# Patient Record
Sex: MALE | Race: WHITE | Employment: STUDENT | URBAN - METROPOLITAN AREA
[De-identification: names, ages, dates, MRNs, and addresses within clinical notes are randomized per-mention and may not be internally consistent; named-entity substitution may affect disease eponyms.]

---

## 2024-01-09 DIAGNOSIS — D57.1 SICKLE CELL DISEASE WITHOUT CRISIS (H): ICD-10-CM

## 2024-01-09 DIAGNOSIS — Z13.6 SCREENING FOR HEART DISEASE: Primary | ICD-10-CM

## 2024-01-11 ENCOUNTER — HOSPITAL ENCOUNTER (OUTPATIENT)
Dept: CARDIOLOGY | Facility: CLINIC | Age: 24
Discharge: HOME | End: 2024-01-11
Attending: PREVENTIVE MEDICINE | Admitting: PREVENTIVE MEDICINE
Payer: COMMERCIAL

## 2024-01-11 PROCEDURE — 93005 ELECTROCARDIOGRAM TRACING: CPT

## 2024-01-11 PROCEDURE — 93010 ELECTROCARDIOGRAM REPORT: CPT | Performed by: INTERNAL MEDICINE

## 2024-01-12 ENCOUNTER — OFFICE VISIT (OUTPATIENT)
Dept: ORTHOPEDICS | Facility: CLINIC | Age: 24
End: 2024-01-12
Payer: COMMERCIAL

## 2024-01-12 ENCOUNTER — LAB (OUTPATIENT)
Dept: LAB | Facility: CLINIC | Age: 24
End: 2024-01-12
Payer: COMMERCIAL

## 2024-01-12 VITALS
BODY MASS INDEX: 24.75 KG/M2 | DIASTOLIC BLOOD PRESSURE: 67 MMHG | SYSTOLIC BLOOD PRESSURE: 125 MMHG | HEIGHT: 72 IN | WEIGHT: 182.7 LBS | HEART RATE: 87 BPM

## 2024-01-12 DIAGNOSIS — D57.1 SICKLE CELL DISEASE WITHOUT CRISIS (H): ICD-10-CM

## 2024-01-12 DIAGNOSIS — Z02.5 SPORTS PHYSICAL: Primary | ICD-10-CM

## 2024-01-12 PROCEDURE — 83020 HEMOGLOBIN ELECTROPHORESIS: CPT

## 2024-01-12 NOTE — LETTER
1/12/2024      RE: James Kasper  [address] [facility] [address]     Dear Colleague,    Thank you for referring your patient, James Kasper, to the [facility]. Please see a copy of my visit note below.    Active problem list:    Right Meniscectomy - 12/18/23 3.5 weeks ago    Inactive problem list:  Left meniscectomy 2020 - doing well, no problems has played a few seasons without difficulty.    PHYSICAL EXAMNATION:      Cervical:  Full range of motion, no paraspinal muscle tenderness, no tenderness over the spinous process, no pain with axial loading, 5/5 strength throughout his upper extremities including shoulder shrug.    Shoulder:  Full range of motion, bilaterally.  No signs of instability or impingement, 5/5 strength of his rotator cuff.   He has full range of motion of the right shoulder, negative palpation tenderness.    Hand:  Normal exam.    Elbow:  Full range of motion, stable to varus and valgus stress, no effusion, full spination/pronation.      Wrist: Full range of motion of the wrist.    Spine: Full range of motion, forward flexion, lateral bending in extension.  No discomfort with single leg extension and no paraspinal muscle tenderness to palpation or with spinous processes.  He has 2+ reflexes throughout his lower extremity and 5/5 strength, negative straight leg raising test in the sitting position and lying down.      Hips: Full range of motion, 5/5 strength in flexion, extension, abduction, adduction, no groin pain.    Knee:   Full range of motion, stable to varus and valgus stress, negative Lachman s, negative pivot shift, Negative posterior drawer.  No medial or lateral joint line pain, no effusion, negative patella femoral signs or symptoms, negative patella tilt, negative patella glide.      Ankle:  Full range of motion, 5/5 strength with dorsiflexion, plantar flexion, inversion and eversion, negative anterior drawer, negative external rotation test.       Foot:   Normal.    X-RAYS:   No X-rays were obtained today.    IMPRESSION:  Clear for Rehab, not clear for footabll.      Again, thank you for allowing me to participate in the care of your patient.      Sincerely,    Alex Ferguson MD

## 2024-01-12 NOTE — PROGRESS NOTES
Active problem list:    Right Meniscectomy - 12/18/23 3.5 weeks ago    Inactive problem list:  Left meniscectomy 2020 - doing well, no problems has played a few seasons without difficulty.    PHYSICAL EXAMNATION:      Cervical:  Full range of motion, no paraspinal muscle tenderness, no tenderness over the spinous process, no pain with axial loading, 5/5 strength throughout his upper extremities including shoulder shrug.    Shoulder:  Full range of motion, bilaterally.  No signs of instability or impingement, 5/5 strength of his rotator cuff.   He has full range of motion of the right shoulder, negative palpation tenderness.    Hand:  Normal exam.    Elbow:  Full range of motion, stable to varus and valgus stress, no effusion, full spination/pronation.      Wrist: Full range of motion of the wrist.    Spine: Full range of motion, forward flexion, lateral bending in extension.  No discomfort with single leg extension and no paraspinal muscle tenderness to palpation or with spinous processes.  He has 2+ reflexes throughout his lower extremity and 5/5 strength, negative straight leg raising test in the sitting position and lying down.      Hips: Full range of motion, 5/5 strength in flexion, extension, abduction, adduction, no groin pain.    Knee:   Full range of motion, stable to varus and valgus stress, negative Lachman s, negative pivot shift, Negative posterior drawer.  No medial or lateral joint line pain, no effusion, negative patella femoral signs or symptoms, negative patella tilt, negative patella glide.      Ankle:  Full range of motion, 5/5 strength with dorsiflexion, plantar flexion, inversion and eversion, negative anterior drawer, negative external rotation test.      Foot:   Normal.    X-RAYS:   No X-rays were obtained today.    IMPRESSION:  Clear for Rehab, not clear for footabll.

## 2024-01-13 ENCOUNTER — OFFICE VISIT (OUTPATIENT)
Dept: FAMILY MEDICINE | Facility: CLINIC | Age: 24
End: 2024-01-13
Payer: COMMERCIAL

## 2024-01-13 VITALS
HEART RATE: 87 BPM | BODY MASS INDEX: 24.79 KG/M2 | SYSTOLIC BLOOD PRESSURE: 125 MMHG | WEIGHT: 183 LBS | HEIGHT: 72 IN | DIASTOLIC BLOOD PRESSURE: 67 MMHG

## 2024-01-13 DIAGNOSIS — J45.990 EXERCISE-INDUCED ASTHMA: ICD-10-CM

## 2024-01-13 DIAGNOSIS — Z02.5 SPORTS PHYSICAL: Primary | ICD-10-CM

## 2024-01-13 RX ORDER — ALBUTEROL SULFATE 90 UG/1
1-2 AEROSOL, METERED RESPIRATORY (INHALATION) EVERY 6 HOURS PRN
Qty: 18 G | Refills: 3 | Status: SHIPPED | OUTPATIENT
Start: 2024-01-13

## 2024-01-13 RX ORDER — ALBUTEROL SULFATE 90 UG/1
1-2 AEROSOL, METERED RESPIRATORY (INHALATION) EVERY 6 HOURS PRN
COMMUNITY
Start: 2023-10-30 | End: 2024-01-13

## 2024-01-13 NOTE — PROGRESS NOTES
James Kasper presents for PPE for football  History of exercise induced asthma  Needs refill on proair    Vitals: /67   Pulse 87   Ht 1.829 m (6')   Wt 83 kg (183 lb)   BMI 24.82 kg/m    BMI= Body mass index is 24.82 kg/m .  Sport(s): Football    Vision: Right Eye: 20/13 Left Eye: 20/25 Both Eyes: 20/20  Correction: none  Pupils: equal    Sickle Cell Trait: Discussed and Patient accepted Sickle Cell Trait testing  Concussions: Concussion fact sheet reviewed. Student Athlete gave written and verbal agreement to report any suspected concussions. 1 previous 3-4 y ago. Recovered well, no issues since.    General/Medical  Eyes/Vision: Normal  Ears/Hearing: Normal  Nose: Normal  Mouth/Dental: Normal  Throat: Normal  Thyroid: Normal  Lymph Nodes: Normal  Lungs: Normal  Abdomen: Normal    Skin: Normal    Musculoskeletal/Orthopaedic  Per ortho    Cardiovascular Screening    EKG: sinus. ST elevation, consider early repolarization, pericarditis, or injury.     Heart Murmur:No Grade: NA  Symmetric Femoral pulses: Yes    Stigmata of Marfan's Syndrome - if appropriate:  Not applicable    Assessment/Plan  24 yo male here for sports physical    COMMENTS, RECOMMENDATIONS and PARTICIPATION STATUS  Cleared.   Follow-up sickle cell result (previous negative).   No red flags on EKG, will have cardiology review for reassurance.  Albuterol refilled today for PRN use with well-controlled exercise induced asthma.    Dr Hernandez

## 2024-01-13 NOTE — LETTER
1/13/2024      RE: James Kasper  [address] [place] [address]     Dear Colleague,    Thank you for referring your patient, James Kasper, to the [facility]. Please see a copy of my visit note below.    James Kasper presents for PPE for football  History of exercise induced asthma  Needs refill on proair    Vitals: /67   Pulse 87   Ht 1.829 m (6')   Wt 83 kg (183 lb)   BMI 24.82 kg/m    BMI= Body mass index is 24.82 kg/m .  Sport(s): Football    Vision: Right Eye: 20/13 Left Eye: 20/25 Both Eyes: 20/20  Correction: none  Pupils: equal    Sickle Cell Trait: Discussed and Patient accepted Sickle Cell Trait testing  Concussions: Concussion fact sheet reviewed. Student Athlete gave written and verbal agreement to report any suspected concussions. 1 previous 3-4 y ago. Recovered well, no issues since.    General/Medical  Eyes/Vision: Normal  Ears/Hearing: Normal  Nose: Normal  Mouth/Dental: Normal  Throat: Normal  Thyroid: Normal  Lymph Nodes: Normal  Lungs: Normal  Abdomen: Normal    Skin: Normal    Musculoskeletal/Orthopaedic  Per ortho    Cardiovascular Screening    EKG: sinus. ST elevation, consider early repolarization, pericarditis, or injury.     Heart Murmur:No Grade: NA  Symmetric Femoral pulses: Yes    Stigmata of Marfan's Syndrome - if appropriate:  Not applicable    Assessment/Plan  24 yo male here for sports physical    COMMENTS, RECOMMENDATIONS and PARTICIPATION STATUS  Cleared.   Follow-up sickle cell result (previous negative).   No red flags on EKG, will have cardiology review for reassurance.  Albuterol refilled today for PRN use with well-controlled exercise induced asthma.    Dr Hernandez        Again, thank you for allowing me to participate in the care of your patient.      Sincerely,    Juaquin Hernandez MD

## 2024-01-15 LAB
ATRIAL RATE - MUSE: 65 BPM
DIASTOLIC BLOOD PRESSURE - MUSE: NORMAL MMHG
INTERPRETATION ECG - MUSE: NORMAL
P AXIS - MUSE: 39 DEGREES
PR INTERVAL - MUSE: 184 MS
QRS DURATION - MUSE: 94 MS
QT - MUSE: 410 MS
QTC - MUSE: 426 MS
R AXIS - MUSE: 74 DEGREES
SYSTOLIC BLOOD PRESSURE - MUSE: NORMAL MMHG
T AXIS - MUSE: 54 DEGREES
VENTRICULAR RATE- MUSE: 65 BPM

## 2024-01-16 LAB — HGB S BLD QL: NEGATIVE

## 2024-01-25 ENCOUNTER — DOCUMENTATION ONLY (OUTPATIENT)
Dept: ORTHOPEDICS | Facility: CLINIC | Age: 24
End: 2024-01-25

## 2024-01-30 ENCOUNTER — OFFICE VISIT (OUTPATIENT)
Dept: ORTHOPEDICS | Facility: CLINIC | Age: 24
End: 2024-01-30
Payer: COMMERCIAL

## 2024-01-30 DIAGNOSIS — M25.561 CHRONIC PAIN OF RIGHT KNEE: Primary | ICD-10-CM

## 2024-01-30 DIAGNOSIS — G89.29 CHRONIC PAIN OF RIGHT KNEE: Primary | ICD-10-CM

## 2024-01-30 NOTE — LETTER
1/30/2024      RE: James Kasper  [address] [facility] [address]     Dear Colleague,    Thank you for referring your patient, James Kasper, to the [facility]. Please see a copy of my visit note below.    James Kasper is a very pleasant 23-year-old male.  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  I did his preparticipation orthopedic physical on 1/12/2024.  At that time he was 3-1/2 weeks status post arthroscopic meniscectomy on the right.  I cleared him for workouts as directed by the athletic training staff but it was he did not get cleared for all football activities.  He is now 6-week out.  He states that his knee feels normal.  He comes in today for full clearance    Examination today shows a pleasant male no acute distress he is articulate and interactive his range of motion is full.  He feels confident in his abilities.  To return.  Stable to varus valgus stress testing.  Stable intra posterior drawer testing.  No pivot shift.  Lachman 0.  Healed surgical incisions    Clinical assessment: 6-week status post right arthroscopic meniscectomy    Plan: Long discussion with the patient.  Reviewed the diagnosis potential treatment options.  This time he is cleared for all activities.  He is done very well.  He will let us know if he should have problems going forward but he is now cleared for all football activities            Again, thank you for allowing me to participate in the care of your patient.      Sincerely,    Alex Ferguson MD

## 2024-01-31 NOTE — PROGRESS NOTES
James Kasper is a very pleasant 23-year-old male.  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  I did his preparticipation orthopedic physical on 1/12/2024.  At that time he was 3-1/2 weeks status post arthroscopic meniscectomy on the right.  I cleared him for workouts as directed by the athletic training staff but it was he did not get cleared for all football activities.  He is now 6-week out.  He states that his knee feels normal.  He comes in today for full clearance    Examination today shows a pleasant male no acute distress he is articulate and interactive his range of motion is full.  He feels confident in his abilities.  To return.  Stable to varus valgus stress testing.  Stable intra posterior drawer testing.  No pivot shift.  Lachman 0.  Healed surgical incisions    Clinical assessment: 6-week status post right arthroscopic meniscectomy    Plan: Long discussion with the patient.  Reviewed the diagnosis potential treatment options.  This time he is cleared for all activities.  He is done very well.  He will let us know if he should have problems going forward but he is now cleared for all football activities

## 2024-03-08 NOTE — PROGRESS NOTES
"James had a right meniscectomy on 12/18/23, prior to his arrival on campus. At the time of his physical with Dr. Ferguson on 1/12 (about 4 weeks post-op), he was cleared to rehab with athletic training, but was not cleared for football. Since then, we have begun a run progression and continued functional training.     James did not complete any run progression prior to arriving to [place]. He had run routes on his own, but was instructed not to do so again until completing a run progression.     1/16 *ran well, needs to build cardiovascular endurance*  Bike x 5 min   ROM   TKE 3 x 10   SL glute bridge 2 x 10   Lateral step down 3 x 10   Pedro 3 way long arc quad 3 x 8 / each  Shuttle kickback #1 3 x 8   Treadmill run   - 10 min jog @ 7mph   - 30 sec on, 30 sec off @ 11.5 / 12.5 / 13.5 / 14.5 / 13.5    1/17  ROM / TKE / HS cycle 2 x 10  DB wall pick-up #30 3 x 10   Shuttle SL press #5 3 x 8   Oxefit split squat eccentric 3 sec down 3 x 8   Treadmill run (15 min total)   - 3 min jog @ 7mph   - 30 sec on, 30 sec off @ 11 / 12 / 13 / 14  - 15 sec on, 45 sec off @ 15 / 16 / 17 / 16 / 15  - 3 min jog @ 7mph    1/18  Bike / ROM / TKE / HS cycle 2 x 10  Bosu SL glute bridge 2 x 10   Glass sidelying hip abduction 2 x 10   Japanese split squat 3 x 8   SL wall sit 4 x 30 sec  BFR bike x 15 min     1/19 *field run*  Bike / lower body stretch   30 yd build x 4  5 accel, 5 sprint, 5 decel x 6  10 yd sprint x 4  Striders x 6   18\" box jump up - DL to SL 3 x 5   SL to SL to DL jump 3 x 5   18\" box jump down - SL to SL to SL lateral bound 3 x 4     1/20 *did team workout, but did not run gassers*  Pedro HS cycle 2 x 10   BFR long arc quad #5 30, 15, 15, 15  BFR SLR #5 30, 15, 15, 15  BFR prone HS curl #10 30, 15, 15,15    1/22 *linear speed run w/ team*  Bike / TKE   Lateral step down 2 x 10   Bosu SL glute bridge 2 x 10   Shuttle SL press #5 3 x 8  Shuttle kickback #2 3 x 8   Japanese split squat #20 3 x 8   Prone HS curl " #10 3 x 10   Split squat hops #10 3 x 8    1/23 *recovery - compex, normatec*  ROM / foam roll / STM   Step up 3 x 8   Forward step down 2 x 6   McCalla HS pulldown 3 x 8   Hydrojog x 10 min     1/24  Pelvic tilt 3 x 12  Glute bridge walkout 2 x 10   Shuttle SL hold #5 10 x 10 sec  Hydrojog x 15 min     1/25  TKE 2 x 10  SL glute bridge 2 x 10   Sidelying hip abductio #2 2 x 10 / each   Rotational step up 3 x 8   Shuttle sidelying SL press #4 3 x 8   Dry needle quad x 25 min     James has done a great job with his run progression and rehab. Will continue to do rehab and treat as needed. Will follow-up with Dr. Ferguson at 6 weeks for final clearance.    Universal Safety Interventions

## 2024-03-23 ENCOUNTER — APPOINTMENT (OUTPATIENT)
Dept: GENERAL RADIOLOGY | Facility: CLINIC | Age: 24
End: 2024-03-23
Attending: EMERGENCY MEDICINE
Payer: COMMERCIAL

## 2024-03-23 ENCOUNTER — OFFICE VISIT (OUTPATIENT)
Dept: ORTHOPEDICS | Facility: CLINIC | Age: 24
End: 2024-03-23
Payer: COMMERCIAL

## 2024-03-23 ENCOUNTER — HOSPITAL ENCOUNTER (EMERGENCY)
Facility: CLINIC | Age: 24
Discharge: HOME | End: 2024-03-23
Attending: EMERGENCY MEDICINE | Admitting: EMERGENCY MEDICINE
Payer: COMMERCIAL

## 2024-03-23 VITALS
TEMPERATURE: 98.3 F | SYSTOLIC BLOOD PRESSURE: 132 MMHG | HEART RATE: 88 BPM | RESPIRATION RATE: 18 BRPM | OXYGEN SATURATION: 92 % | DIASTOLIC BLOOD PRESSURE: 64 MMHG

## 2024-03-23 VITALS — BODY MASS INDEX: 24.82 KG/M2 | WEIGHT: 183 LBS

## 2024-03-23 DIAGNOSIS — S52.92XA CLOSED FRACTURE OF LEFT RADIUS AND ULNA, INITIAL ENCOUNTER: ICD-10-CM

## 2024-03-23 DIAGNOSIS — S52.202A FOREARM FRACTURES, BOTH BONES, CLOSED, LEFT, INITIAL ENCOUNTER: Primary | ICD-10-CM

## 2024-03-23 DIAGNOSIS — S52.202A CLOSED FRACTURE OF LEFT RADIUS AND ULNA, INITIAL ENCOUNTER: ICD-10-CM

## 2024-03-23 DIAGNOSIS — S52.92XA FOREARM FRACTURES, BOTH BONES, CLOSED, LEFT, INITIAL ENCOUNTER: Primary | ICD-10-CM

## 2024-03-23 PROCEDURE — 999N000065 XR FOREARM LEFT 2 VIEWS

## 2024-03-23 PROCEDURE — 250N000013 HC RX MED GY IP 250 OP 250 PS 637: Performed by: EMERGENCY MEDICINE

## 2024-03-23 PROCEDURE — 250N000011 HC RX IP 250 OP 636: Performed by: EMERGENCY MEDICINE

## 2024-03-23 PROCEDURE — 250N000011 HC RX IP 250 OP 636

## 2024-03-23 PROCEDURE — 73090 X-RAY EXAM OF FOREARM: CPT | Mod: LT

## 2024-03-23 PROCEDURE — 99284 EMERGENCY DEPT VISIT MOD MDM: CPT | Performed by: EMERGENCY MEDICINE

## 2024-03-23 PROCEDURE — 250N000009 HC RX 250: Performed by: EMERGENCY MEDICINE

## 2024-03-23 PROCEDURE — 73070 X-RAY EXAM OF ELBOW: CPT | Mod: LT

## 2024-03-23 PROCEDURE — 73110 X-RAY EXAM OF WRIST: CPT | Mod: LT

## 2024-03-23 PROCEDURE — 250N000009 HC RX 250

## 2024-03-23 PROCEDURE — 999N000180 XR SURGERY CARM FLUORO LESS THAN 5 MIN: Mod: TC

## 2024-03-23 RX ORDER — PROPOFOL 10 MG/ML
INJECTION, EMULSION INTRAVENOUS
Status: COMPLETED
Start: 2024-03-23 | End: 2024-03-23

## 2024-03-23 RX ORDER — KETAMINE HCL IN 0.9 % NACL 20 MG/2 ML
0.2 SYRINGE (ML) INTRAVENOUS ONCE
Status: COMPLETED | OUTPATIENT
Start: 2024-03-23 | End: 2024-03-23

## 2024-03-23 RX ORDER — KETAMINE HCL IN 0.9 % NACL 20 MG/2 ML
34 SYRINGE (ML) INTRAVENOUS ONCE
Status: DISCONTINUED | OUTPATIENT
Start: 2024-03-23 | End: 2024-03-23 | Stop reason: HOSPADM

## 2024-03-23 RX ORDER — OXYCODONE HYDROCHLORIDE 5 MG/1
5 TABLET ORAL EVERY 6 HOURS PRN
Qty: 12 TABLET | Refills: 0 | Status: SHIPPED | OUTPATIENT
Start: 2024-03-23

## 2024-03-23 RX ORDER — OXYCODONE HYDROCHLORIDE 5 MG/1
5 TABLET ORAL EVERY 6 HOURS PRN
Qty: 12 TABLET | Refills: 0 | Status: SHIPPED | OUTPATIENT
Start: 2024-03-23 | End: 2024-03-23

## 2024-03-23 RX ORDER — KETAMINE HCL IN 0.9 % NACL 20 MG/2 ML
17 SYRINGE (ML) INTRAVENOUS ONCE
Status: COMPLETED | OUTPATIENT
Start: 2024-03-23 | End: 2024-03-23

## 2024-03-23 RX ORDER — KETAMINE HCL IN 0.9 % NACL 20 MG/2 ML
SYRINGE (ML) INTRAVENOUS
Status: COMPLETED
Start: 2024-03-23 | End: 2024-03-23

## 2024-03-23 RX ORDER — PROPOFOL 10 MG/ML
320 INJECTION, EMULSION INTRAVENOUS ONCE
Status: COMPLETED | OUTPATIENT
Start: 2024-03-23 | End: 2024-03-23

## 2024-03-23 RX ORDER — HYDROMORPHONE HYDROCHLORIDE 1 MG/ML
0.5 INJECTION, SOLUTION INTRAMUSCULAR; INTRAVENOUS; SUBCUTANEOUS
Status: COMPLETED | OUTPATIENT
Start: 2024-03-23 | End: 2024-03-23

## 2024-03-23 RX ORDER — OXYCODONE HYDROCHLORIDE 5 MG/1
5 TABLET ORAL ONCE
Status: COMPLETED | OUTPATIENT
Start: 2024-03-23 | End: 2024-03-23

## 2024-03-23 RX ADMIN — PROPOFOL 40 MG: 10 INJECTION, EMULSION INTRAVENOUS at 15:40

## 2024-03-23 RX ADMIN — Medication 17 MG: at 15:48

## 2024-03-23 RX ADMIN — Medication 17 MG: at 15:33

## 2024-03-23 RX ADMIN — PROPOFOL 40 MG: 10 INJECTION, EMULSION INTRAVENOUS at 15:41

## 2024-03-23 RX ADMIN — PROPOFOL 80 MG: 10 INJECTION, EMULSION INTRAVENOUS at 15:37

## 2024-03-23 RX ADMIN — PROPOFOL 80 MG: 10 INJECTION, EMULSION INTRAVENOUS at 15:30

## 2024-03-23 RX ADMIN — PROPOFOL 40 MG: 10 INJECTION, EMULSION INTRAVENOUS at 15:36

## 2024-03-23 RX ADMIN — HYDROMORPHONE HYDROCHLORIDE 0.5 MG: 1 INJECTION, SOLUTION INTRAMUSCULAR; INTRAVENOUS; SUBCUTANEOUS at 14:54

## 2024-03-23 RX ADMIN — Medication 40 MG: at 15:34

## 2024-03-23 ASSESSMENT — COLUMBIA-SUICIDE SEVERITY RATING SCALE - C-SSRS
1. IN THE PAST MONTH, HAVE YOU WISHED YOU WERE DEAD OR WISHED YOU COULD GO TO SLEEP AND NOT WAKE UP?: NO
6. HAVE YOU EVER DONE ANYTHING, STARTED TO DO ANYTHING, OR PREPARED TO DO ANYTHING TO END YOUR LIFE?: NO
2. HAVE YOU ACTUALLY HAD ANY THOUGHTS OF KILLING YOURSELF IN THE PAST MONTH?: NO

## 2024-03-23 ASSESSMENT — ACTIVITIES OF DAILY LIVING (ADL)
ADLS_ACUITY_SCORE: 35

## 2024-03-23 NOTE — ED PROVIDER NOTES
ED Provider Note  [facility]      History     Chief Complaint   Patient presents with    Wrist Pain     Onset prior to arrival, fell on left wrist playing football, increasing pain.     MANJEET Kasper is a 23 year old male with a history of exercise-induced asthma who presents to the emergency department with left forearm pain.  He reports he was playing football earlier today and tried to brace himself and fell onto his left arm.  Images were obtained at the [facility] center and concerning for both bone forearm fracture.  He reports he does have some numbness into his left fourth and fifth digits.  He denies any other injury from the fall.  He last ate at 830 this morning.  He has had previous sedations without any issues.  Denies any recent URI symptoms.  He is right-hand dominant.      Physical Exam   BP: (!) 146/70  Pulse: 84  Temp: 98.3  F (36.8  C)  Resp: 16  SpO2: 98 %  Physical Exam  General: patient is alert and oriented and in no acute distress   Head: atraumatic and normocephalic   EENT: moist mucus membranes without tonsillar erythema or exudates, no peritonsillar swelling, no trismus, pupils round and reactive   Neck: supple   Cardiovascular: regular rate and rhythm, extremities warm and well perfused, no lower extremity edema  Pulmonary: lungs clear to auscultation bilaterally   Abdomen: soft, non-tender   Musculoskeletal: Left forearm is in splint, brisk capillary refill in the digits of the left hand, notes diminished sensation to light touch along the fourth and fifth digits of the left hand, able to move all digits  Neurological: alert and oriented, moving all extremities symmetrically, gait normal   Skin: warm, dry       ED Course, Procedures, & Data      [facility]    Procedure: Sedation    Date/Time: 3/24/2024 7:11 AM    Performed by: Pat Baker MD  Authorized by: Pat Baker MD    Risks, benefits  and alternatives discussed.      PROCEDURE  Describe Procedure: Procedural sedation for reduction of both bone left forearm fracture.  Patient was placed on end-tidal monitoring and full cardiac monitoring.  He received a total of 320 mg of propofol and 34 mg of ketamine for procedural sedation.  He tolerated the procedure well without any episodes of apnea or hypoxia.  Patient Tolerance:  Patient tolerated the procedure well with no immediate complications              No results found for any visits on 03/23/24.  Medications   oxyCODONE (ROXICODONE) tablet 5 mg (has no administration in time range)     Labs Ordered and Resulted from Time of ED Arrival to Time of ED Departure - No data to display  Radius/Ulna XR,  PA &LAT, left    (Results Pending)   Elbow XR,  2 views, left    (Results Pending)   XR Surgery ALTHEA L/T 5 Min Fluoro    (Results Pending)          Critical care was not performed.     Medical Decision Making  The patient's presentation was of moderate complexity (an acute complicated injury).    The patient's evaluation involved:  ordering and/or review of 2 test(s) in this encounter (XRs)  discussion of management or test interpretation with another health professional (ortho)    The patient's management necessitated moderate risk (prescription drug management including medications given in the ED) and high risk (a parenteral controlled substance).    Assessment & Plan    Ms. Kasper is a 23 year old male with a history of exercise-induced asthma who presents to the emergency department with both bone left forearm fracture.  He does have some ulnar paresthesias otherwise is neurovascularly intact.  He underwent procedural sedation for attempted reduction by orthopedics and splint placement.  Patient tolerated the procedure without difficulty.  He denies any other injury from today.  No other acute medical concerns.  Patient was signed out to Dr. Wagner pending reevaluation after procedural sedation to  ensure safe for discharge.    I have reviewed the nursing notes. I have reviewed the findings, diagnosis, plan and need for follow up with the patient.    Discharge Medication List as of 3/23/2024  4:37 PM          Final diagnoses:   Closed fracture of left radius and ulna, initial encounter       Pat Johnson MD  [facility] EMERGENCY DEPARTMENT  3/23/2024     Pat Johnson MD  03/24/24 0718

## 2024-03-23 NOTE — CONSULTS
Orthopaedic Surgery Consultation    DATE OF SERVICE: 3/23/2024    This is a consultation requested by ED for both bone forearm fracture.    CHIEF COMPLAINT: Left forearm/wrist pain    HISTORY OBTAINED FROM: Patient    HISTORY: This is a 23 year old old RHD male with PMH none who presents with left both bone forearm fracture sustained today while at football practice at the [facility]. Patient tripped while running a route and sustained the closed injury. Denies any other pain or injury. Patient was unable to bear weight on the arm after the injury. Denies numbness, paresthesias, head trauma, LOC, pain anywhere else in thebody. Denies history or injury or prior surgery to the left arm.  Location: Left forearm/wrist  Time of Onset: today  Severity: 10-/10  Quality: sharp  Radiation: none  Exacerbating: movement, WB  Alleviating: rest  Associated symptoms: none    PAST MEDICAL HISTORY:   No past medical history on file.    PAST SURGICAL HISTORY:   No past surgical history on file.    FAMILY HISTORY: Reviewed with patient and is non-contributory.  No personal or family history of bleeding or clotting disorders  No personal or family history of adverse reactions to anesthesia    SOCIAL HISTORY:  Tobacco - None  Alcohol - none  Occupation - Student, [employer]; football team    ALLERGIES:  Allergies   Allergen Reactions    Keflex [Cephalexin]     Penicillins        MEDS:  Blood Thinners:     Prior to Admission medications    Medication Sig Last Dose Taking? Auth Provider Long Term End Date   oxyCODONE (ROXICODONE) 5 MG tablet Take 1 tablet (5 mg) by mouth every 6 hours as needed for severe pain  Yes Pat Baker MD  3/26/24   albuterol (PROAIR HFA/PROVENTIL HFA/VENTOLIN HFA) 108 (90 Base) MCG/ACT inhaler Inhale 1-2 puffs into the lungs every 6 hours as needed for shortness of breath   uJaquin Rdz MD Yes        REVIEW OF SYSTEMS: An 11-point systems review was performed and negative except as  noted in  the [facility].    PHYSICAL EXAMINATION:  Vitals:    03/23/24 1544 03/23/24 1547 03/23/24 1553 03/23/24 1555   BP:    131/77   BP Location:    Right arm   Pulse:    74   Resp: 14 13 14 12   Temp:       TempSrc:       SpO2:    100%       Gen: Awake and Alert, pleasant, interactive, appears in pain.  Psych: Articulates and Communicates with a normal affect  HEENT: Normal and atraumatic  Pulm: Non-labored breathing at rest. Saturating well on RA.    CV: RRR  Extremities:  LUE: Obvious deformity, skin intact.  TTP over forearm. Non-tender to palpation over clavicle, wrist, shoulder, arm,  elbow, snuffbox, fingers  Unable to perform elbow/wrist ROM due to pain. Normal ROM shoulder  without pain. + FPL/EPL/FDS/FDP/IO/lumbricals.  SILT over m/r/u distributions.  Radial pulse palpable.    RUE: No deformity, skin intact.  Non-tender to palpation over clavicle, shoulder, arm, elbow, forearm, wrist.  Normal ROM shoulder, elbow, wrist without pain. + FPL/EPL/intrinsics.  SILT over m/r/u distributions.  Radial pulse palpable.      LABS/IMAGING:    Imaging:  Review of x-rays shows left displaced comminuted angulated both bone forearm fracture.    PROCEDURES:  Procedure: closed reduction and long arm splinting of both bone forearm fracture  Patient's identity confirmed verbally and matched to wrist band. Verbal consent obtained after  discussing goals, risks, benefits, and alternatives. Correct side verified with the patient and  radiographic studies. Neurovascular status checked pre reduction and cast application. Left  radial pulse intact. Motor and sensory intact in all distributions.Traction and manipulation applied to the left fracture/dislocation to obtain reduction. Well padded sugartong w posterior slab splint applied. Neurovascular status checked post procedure, found to be intact post-reduction. Patient tolerated the procedure well without any immediate complications. Post-reduction films obtained. Patient instructed to keep  the extremity elevated and apply ice as needed to help improve pain and swelling. Asked to come back if develops new problems with numbness, tingling, swelling, discoloration, difficulty moving the  extremity, or shortness of breath.    IMPRESSION AND PLAN: A 23 year old old RHD male with left both bone forearm fracture.  Closed reduction and long arm splinting performed. Risks, benefits and alternatives of both  operative and closed treatment were discussed. Questions answered.  - Anticoagulation: none indicated from orthopaedic perspective  - Antibiotics/tetanus: None  - Xrays/imaging: complete  - Activity: Up ad maria g  - Weight Bearing: NWB LUE  - Pain control: PO pain meds per ED  - Diet:  OK for regular diet   - Dispo: Discharge from ED today  - Follow-up: ORIF L BBFx courtney Woodard vs. Marty on 03/25 vs. 03/26    Patient was discussed with Dr. Pat Quiñones, fellow. Orthopaedic staff for this patient is Dr. Vance Mendez MD  Orthopaedic Surgery PGY-4

## 2024-03-23 NOTE — LETTER
3/23/2024      RE: James Kasper  [address] [address]     Dear Colleague,    Thank you for referring your patient, James Kasper, to the [facility]. Please see a copy of my visit note below.    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  This happened when he fell onto an outstretched hand.  He noted immediate pain and deformity left forearm.  He has pain in the mid left forearm.  Denies any pain in the shoulder elbow or wrist.  No numbness or tingling in the fingers.    Objective:   PE:  LUE: No pain to palpation of the shoulder, humerus,.  No pain to palpation over the medial or lateral epicondyle of the elbow or olecranon.  No deformity of the midshaft of the forearm.  No open wounds or lacerations.  No at risk skin, nonblanchable skin, or tenting skin.  By palpation, the ulna appears to have buttonholed through the fascia.  Patient able to flex and extend the MCP, PIP, DIP in all 5 digits as well as abduct and adduct all 5 digits limited by pain.  Sensation tact in radial, median, and ulnar nerve distributions upper extremity.  2+ radial pulse.     Imaging:   Mini C arm fluoroscopy evaluation of the forearm and training today shows short oblique fracture of the radius and ulna at the mid diaphysis with 100% displacement and mild shortening.    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  I will send him to the ER for reduction under conscious sedation and splinting and to obtain formal elbow will, forearm, and wrist x-rays.  As he is an adult, this meets operative indication.  We will plan his surgery in the coming days.    Dionicio Woodard MD    [facility]   Department of Orthopedic Surgery      Disclaimer: This note  consists of symbols derived from keyboarding, dictation and/or voice recognition software. As a result, there may be errors in the script that have gone undetected. Please consider this when interpreting information found in this chart.        Again, thank you for allowing me to participate in the care of your patient.      Sincerely,    Alex Ferguson MD

## 2024-03-23 NOTE — DISCHARGE INSTRUCTIONS
Please make an appointment to follow up with [facility] (phone: [phone]) next week for surgery.      Keep your arm elevated as much as possible to decrease swelling.  Use acetaminophen 1000 mg every 6 hours for pain and oxycodone sparingly for break through pain.

## 2024-03-23 NOTE — PROGRESS NOTES
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  This happened when he fell onto an outstretched hand.  He noted immediate pain and deformity left forearm.  He has pain in the mid left forearm.  Denies any pain in the shoulder elbow or wrist.  No numbness or tingling in the fingers.    Objective:   PE:  LUE: No pain to palpation of the shoulder, humerus,.  No pain to palpation over the medial or lateral epicondyle of the elbow or olecranon.  No deformity of the midshaft of the forearm.  No open wounds or lacerations.  No at risk skin, nonblanchable skin, or tenting skin.  By palpation, the ulna appears to have buttonholed through the fascia.  Patient able to flex and extend the MCP, PIP, DIP in all 5 digits as well as abduct and adduct all 5 digits limited by pain.  Sensation tact in radial, median, and ulnar nerve distributions upper extremity.  2+ radial pulse.     Imaging:   Mini C arm fluoroscopy evaluation of the forearm and training today shows short oblique fracture of the radius and ulna at the mid diaphysis with 100% displacement and mild shortening.    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  I will send him to the ER for reduction under conscious sedation and splinting and to obtain formal elbow will, forearm, and wrist x-rays.  As he is an adult, this meets operative indication.  We will plan his surgery in the coming days.    Dionicio Woodard MD    [facility]   Department of Orthopedic Surgery      Disclaimer: This note consists of symbols derived from keyboarding, dictation and/or voice recognition software. As a result, there may be errors in the script that have gone undetected. Please consider this when interpreting information found in this chart.

## 2024-03-23 NOTE — ED NOTES
Patient ambulating and tolerating PO fluids. MD notified, patient ready for discharge. PT coming to pick  up

## 2024-03-24 ENCOUNTER — OFFICE VISIT (OUTPATIENT)
Dept: FAMILY MEDICINE | Facility: CLINIC | Age: 24
End: 2024-03-24
Payer: COMMERCIAL

## 2024-03-24 VITALS
DIASTOLIC BLOOD PRESSURE: 64 MMHG | SYSTOLIC BLOOD PRESSURE: 132 MMHG | BODY MASS INDEX: 24.25 KG/M2 | WEIGHT: 183 LBS | HEIGHT: 73 IN

## 2024-03-24 DIAGNOSIS — Z01.818 PREOP GENERAL PHYSICAL EXAM: Primary | ICD-10-CM

## 2024-03-24 NOTE — LETTER
"  3/24/2024      RE: James Kasper  [address] [place] [address]     Dear Colleague,    Thank you for referring your patient, James Kasper, to the [facility]. Please see a copy of my visit note below.    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 Unable to appropriately reduce in training room, so was splinted and transferred to [facility] for formal films and sedated reduction. Feeling better today w/ less pain.    PAST HISTORY  Reports Hx of L and R meniscal surgery. No issues w/ anesthesia.    No past medical history on file.    MEDICATIONS:  Current Outpatient Medications   Medication Sig Dispense Refill     albuterol (PROAIR HFA/PROVENTIL HFA/VENTOLIN HFA) 108 (90 Base) MCG/ACT inhaler Inhale 1-2 puffs into the lungs every 6 hours as needed for shortness of breath 18 g 3     oxyCODONE (ROXICODONE) 5 MG tablet Take 1 tablet (5 mg) by mouth every 6 hours as needed for severe pain 12 tablet 0       ALLERGIES  Keflex [cephalexin] and Penicillins    SOCIAL HISTORY   Lives near [place] w/ GF. Jan 2024 transfer.    FAMILY HISTORY  No family history on file.    Family History of Anesthesia Reaction: No  Family History of Bleeding Disorder:  No    REVIEW OF SYSTEMS  Constitutional, HEENT, cardiovascular, pulmonary, GI, , musculoskeletal, neuro, skin, endocrine and psych systems are negative, except as otherwise noted.    PHYSICAL EXAM  /64   Ht 1.854 m (6' 1\")   Wt 83 kg (183 lb)   BMI 24.14 kg/m      General Appearance: Normal  Skin:  Normal  Head:  Normal  Eyes: Normal  Ears: Normal  Nose: Normal  Mouth/Teeth: Normal  Throat: Normal  Neck: Normal  Chest/Lungs: Normal  Heart/Vascular: Normal  Abdomen: " Normal  Skeletal: Abnormal: L arm in CDI splint. Neurovascularly intact distally      LAB/RADIOLOGY RESULTS  No labs needed per medicine. Defer any additional to anesthesia and ortho.  EKG (or photocopy): 1/9/24 PPE EKG reviewed  Impression: sinus rhythm, early repolarization pattern    ASSESSMENT & PLAN:  This patient has been examined by me this day and has been found to be an acceptable candidate for surgery with appropriate anesthesia.  - Penicillin allergy (angioedema vs. Anaphlaxis)  - Avoid NSAIDs  - Exercises induced asthma. Well controlled, no current issues.  - No other current or chronic illnesses.     Options for treatment and/or follow-up care were reviewed with the patient was actively involved in the decision making process. Patient verbalized understanding and was in agreement with the plan.    The patient was discussed with Dr. Juaquin Hernandez MD.      Aba Claudio MD  Primary Care Sports Medicine Fellow  [facility]  Patient seen and agreed with Dr Claudio note and exam.  Dr Hernandez        Again, thank you for allowing me to participate in the care of your patient.      Sincerely,    Juaquin Hernandez MD

## 2024-03-24 NOTE — PROGRESS NOTES
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 Unable to appropriately reduce in training room, so was splinted and transferred to [facility] for formal films and sedated reduction. Feeling better today w/ less pain.    PAST HISTORY  Reports Hx of L and R meniscal surgery. No issues w/ anesthesia.    No past medical history on file.    MEDICATIONS:  Current Outpatient Medications   Medication Sig Dispense Refill     albuterol (PROAIR HFA/PROVENTIL HFA/VENTOLIN HFA) 108 (90 Base) MCG/ACT inhaler Inhale 1-2 puffs into the lungs every 6 hours as needed for shortness of breath 18 g 3     oxyCODONE (ROXICODONE) 5 MG tablet Take 1 tablet (5 mg) by mouth every 6 hours as needed for severe pain 12 tablet 0       ALLERGIES  Keflex [cephalexin] and Penicillins    SOCIAL HISTORY   Lives near [place] w/ GF. Jan 2024 transfer.    FAMILY HISTORY  No family history on file.    Family History of Anesthesia Reaction: No  Family History of Bleeding Disorder:  No    REVIEW OF SYSTEMS  Constitutional, HEENT, cardiovascular, pulmonary, GI, , musculoskeletal, neuro, skin, endocrine and psych systems are negative, except as otherwise noted.    PHYSICAL EXAM  /64   Ht 1.854 m (6' 1\")   Wt 83 kg (183 lb)   BMI 24.14 kg/m      General Appearance: Normal  Skin:  Normal  Head:  Normal  Eyes: Normal  Ears: Normal  Nose: Normal  Mouth/Teeth: Normal  Throat: Normal  Neck: Normal  Chest/Lungs: Normal  Heart/Vascular: Normal  Abdomen: Normal  Skeletal: Abnormal: L arm in CDI splint. Neurovascularly intact distally      LAB/RADIOLOGY RESULTS  No labs needed per medicine. Defer any additional to anesthesia and ortho.  EKG (or photocopy): 1/9/24 PPE EKG reviewed  Impression: sinus " rhythm, early repolarization pattern    ASSESSMENT & PLAN:  This patient has been examined by me this day and has been found to be an acceptable candidate for surgery with appropriate anesthesia.  - Penicillin allergy (angioedema vs. Anaphlaxis)  - Avoid NSAIDs  - Exercises induced asthma. Well controlled, no current issues.  - No other current or chronic illnesses.     Options for treatment and/or follow-up care were reviewed with the patient was actively involved in the decision making process. Patient verbalized understanding and was in agreement with the plan.    The patient was discussed with Dr. Juaquin Hernandez MD.      Aba Claudio MD  Primary Care Sports Medicine Fellow  [facility]  Patient seen and agreed with Dr Claudio note and exam.  Dr Hernandez

## 2024-03-25 ENCOUNTER — PREP FOR PROCEDURE (OUTPATIENT)
Dept: ORTHOPEDICS | Facility: CLINIC | Age: 24
End: 2024-03-25
Payer: COMMERCIAL

## 2024-03-25 DIAGNOSIS — S52.92XA FOREARM FRACTURES, BOTH BONES, CLOSED, LEFT, INITIAL ENCOUNTER: Primary | ICD-10-CM

## 2024-03-25 DIAGNOSIS — S52.202A FOREARM FRACTURES, BOTH BONES, CLOSED, LEFT, INITIAL ENCOUNTER: Primary | ICD-10-CM

## 2024-03-26 ENCOUNTER — APPOINTMENT (OUTPATIENT)
Dept: ORTHOPEDICS | Facility: CLINIC | Age: 24
End: 2024-03-26
Payer: COMMERCIAL

## 2024-03-26 ENCOUNTER — ANESTHESIA EVENT (OUTPATIENT)
Dept: SURGERY | Facility: AMBULATORY SURGERY CENTER | Age: 24
End: 2024-03-26

## 2024-03-26 NOTE — ANESTHESIA PREPROCEDURE EVALUATION
"Anesthesia Pre-Procedure Evaluation    Patient: James Kasper   MRN: [id number] : 2000        Procedure : Procedure(s):  LEFT OPEN REDUCTION INTERNAL FIXATION, FOREARM          No past medical history on file.   No past surgical history on file.   Allergies   Allergen Reactions    Keflex [Cephalexin]     Penicillins Angioedema      Social History     Tobacco Use    Smoking status: Not on file    Smokeless tobacco: Not on file   Substance Use Topics    Alcohol use: Not on file      Wt Readings from Last 1 Encounters:   24 83 kg (183 lb)        Anesthesia Evaluation            ROS/MED HX  ENT/Pulmonary:  - neg pulmonary ROS     Neurologic:  - neg neurologic ROS     Cardiovascular:  - neg cardiovascular ROS     METS/Exercise Tolerance: >4 METS    Hematologic:  - neg hematologic  ROS     Musculoskeletal:   (+)     fracture,          GI/Hepatic:  - neg GI/hepatic ROS     Renal/Genitourinary:  - neg Renal ROS     Endo:  - neg endo ROS     Psychiatric/Substance Use:  - neg psychiatric ROS     Infectious Disease:  - neg infectious disease ROS     Malignancy:  - neg malignancy ROS     Other:  - neg other ROS          Physical Exam    Airway  airway exam normal      Mallampati: I   TM distance: > 3 FB   Neck ROM: full   Mouth opening: > 3 cm    Respiratory Devices and Support         Dental       (+) Completely normal teeth      Cardiovascular   cardiovascular exam normal       Rhythm and rate: regular and normal     Pulmonary   pulmonary exam normal        breath sounds clear to auscultation           OUTSIDE LABS:  CBC: No results found for: \"WBC\", \"HGB\", \"HCT\", \"PLT\"  BMP: No results found for: \"NA\", \"POTASSIUM\", \"CHLORIDE\", \"CO2\", \"BUN\", \"CR\", \"GLC\"  COAGS: No results found for: \"PTT\", \"INR\", \"FIBR\"  POC: No results found for: \"BGM\", \"HCG\", \"HCGS\"  HEPATIC: No results found for: \"ALBUMIN\", \"PROTTOTAL\", \"ALT\", \"AST\", \"GGT\", \"ALKPHOS\", \"BILITOTAL\", \"BILIDIRECT\", \"JILLIAN\"  OTHER: No results found for: \"PH\", " "\"LACT\", \"A1C\", \"EBER\", \"PHOS\", \"MAG\", \"LIPASE\", \"AMYLASE\", \"TSH\", \"T4\", \"T3\", \"CRP\", \"SED\"    Anesthesia Plan    ASA Status:  1    NPO Status:  NPO Appropriate    Anesthesia Type: MAC.     - Reason for MAC: Deep or markedly invasive procedure (G8)   Induction: Propofol.   Maintenance: N/A.        Consents    Anesthesia Plan(s) and associated risks, benefits, and realistic alternatives discussed. Questions answered and patient/representative(s) expressed understanding.     - Discussed:     - Discussed with:  Patient       Use of blood products discussed: No .     Postoperative Care    Pain management: Multi-modal analgesia, Oral pain medications, Peripheral nerve block (Single Shot).   PONV prophylaxis: Ondansetron (or other 5HT-3), Dexamethasone or Solumedrol     Comments:               Natanael Rivera, DO    I have reviewed the pertinent notes and labs in the chart from the past 30 days and (re)examined the patient.  Any updates or changes from those notes are reflected in this note.                  "

## 2024-03-27 ENCOUNTER — ANCILLARY PROCEDURE (OUTPATIENT)
Dept: RADIOLOGY | Facility: AMBULATORY SURGERY CENTER | Age: 24
End: 2024-03-27
Attending: STUDENT IN AN ORGANIZED HEALTH CARE EDUCATION/TRAINING PROGRAM
Payer: COMMERCIAL

## 2024-03-27 ENCOUNTER — HOSPITAL ENCOUNTER (OUTPATIENT)
Facility: AMBULATORY SURGERY CENTER | Age: 24
Discharge: HOME | End: 2024-03-27
Attending: STUDENT IN AN ORGANIZED HEALTH CARE EDUCATION/TRAINING PROGRAM | Admitting: STUDENT IN AN ORGANIZED HEALTH CARE EDUCATION/TRAINING PROGRAM
Payer: COMMERCIAL

## 2024-03-27 ENCOUNTER — ANCILLARY PROCEDURE (OUTPATIENT)
Dept: ULTRASOUND IMAGING | Facility: CLINIC | Age: 24
End: 2024-03-27
Attending: ANESTHESIOLOGY
Payer: COMMERCIAL

## 2024-03-27 ENCOUNTER — ANESTHESIA (OUTPATIENT)
Dept: SURGERY | Facility: AMBULATORY SURGERY CENTER | Age: 24
End: 2024-03-27
Payer: COMMERCIAL

## 2024-03-27 VITALS
HEIGHT: 73 IN | WEIGHT: 183 LBS | HEART RATE: 72 BPM | DIASTOLIC BLOOD PRESSURE: 67 MMHG | SYSTOLIC BLOOD PRESSURE: 133 MMHG | RESPIRATION RATE: 14 BRPM | BODY MASS INDEX: 24.25 KG/M2 | OXYGEN SATURATION: 100 % | TEMPERATURE: 97.8 F

## 2024-03-27 DIAGNOSIS — S52.90XA FOREARM FRACTURE: ICD-10-CM

## 2024-03-27 DIAGNOSIS — S52.92XA FOREARM FRACTURES, BOTH BONES, CLOSED, LEFT, INITIAL ENCOUNTER: Primary | ICD-10-CM

## 2024-03-27 DIAGNOSIS — S52.202A FOREARM FRACTURES, BOTH BONES, CLOSED, LEFT, INITIAL ENCOUNTER: Primary | ICD-10-CM

## 2024-03-27 PROCEDURE — 25575 OPTX RDL&ULN SHFT FX RDS&ULN: CPT | Mod: LT

## 2024-03-27 PROCEDURE — 24586 OPTX PARTCLR FX&/DISLC ELBOW: CPT | Performed by: NURSE ANESTHETIST, CERTIFIED REGISTERED

## 2024-03-27 DEVICE — IMPLANTABLE DEVICE: Type: IMPLANTABLE DEVICE | Site: ULNA | Status: FUNCTIONAL

## 2024-03-27 DEVICE — SCREW NON-LOCKING T10 3.5X16MM: Type: IMPLANTABLE DEVICE | Site: RADIUS | Status: FUNCTIONAL

## 2024-03-27 DEVICE — IMP SCR BONE 3.5MM 18MML T10 HEX DRIVE FT 657418: Type: IMPLANTABLE DEVICE | Site: RADIUS | Status: FUNCTIONAL

## 2024-03-27 DEVICE — IMP SCR BONE 3.5MM 20MML T10 HEX DRIVE FT 657420: Type: IMPLANTABLE DEVICE | Site: ULNA | Status: FUNCTIONAL

## 2024-03-27 RX ORDER — PROPOFOL 10 MG/ML
INJECTION, EMULSION INTRAVENOUS CONTINUOUS PRN
Status: DISCONTINUED | OUTPATIENT
Start: 2024-03-27 | End: 2024-03-27

## 2024-03-27 RX ORDER — FENTANYL CITRATE 50 UG/ML
25 INJECTION, SOLUTION INTRAMUSCULAR; INTRAVENOUS EVERY 5 MIN PRN
Status: DISCONTINUED | OUTPATIENT
Start: 2024-03-27 | End: 2024-03-27 | Stop reason: HOSPADM

## 2024-03-27 RX ORDER — HYDROMORPHONE HYDROCHLORIDE 1 MG/ML
0.2 INJECTION, SOLUTION INTRAMUSCULAR; INTRAVENOUS; SUBCUTANEOUS EVERY 5 MIN PRN
Status: DISCONTINUED | OUTPATIENT
Start: 2024-03-27 | End: 2024-03-27 | Stop reason: HOSPADM

## 2024-03-27 RX ORDER — FENTANYL CITRATE 50 UG/ML
50 INJECTION, SOLUTION INTRAMUSCULAR; INTRAVENOUS EVERY 5 MIN PRN
Status: DISCONTINUED | OUTPATIENT
Start: 2024-03-27 | End: 2024-03-27 | Stop reason: HOSPADM

## 2024-03-27 RX ORDER — OXYCODONE HYDROCHLORIDE 5 MG/1
5-10 TABLET ORAL EVERY 4 HOURS PRN
Qty: 30 TABLET | Refills: 0 | Status: SHIPPED | OUTPATIENT
Start: 2024-03-27

## 2024-03-27 RX ORDER — NALOXONE HYDROCHLORIDE 0.4 MG/ML
0.2 INJECTION, SOLUTION INTRAMUSCULAR; INTRAVENOUS; SUBCUTANEOUS
Status: DISCONTINUED | OUTPATIENT
Start: 2024-03-27 | End: 2024-03-27 | Stop reason: HOSPADM

## 2024-03-27 RX ORDER — FLUMAZENIL 0.1 MG/ML
0.2 INJECTION, SOLUTION INTRAVENOUS
Status: DISCONTINUED | OUTPATIENT
Start: 2024-03-27 | End: 2024-03-27 | Stop reason: HOSPADM

## 2024-03-27 RX ORDER — SODIUM CHLORIDE, SODIUM LACTATE, POTASSIUM CHLORIDE, CALCIUM CHLORIDE 600; 310; 30; 20 MG/100ML; MG/100ML; MG/100ML; MG/100ML
INJECTION, SOLUTION INTRAVENOUS CONTINUOUS
Status: DISCONTINUED | OUTPATIENT
Start: 2024-03-27 | End: 2024-03-27 | Stop reason: HOSPADM

## 2024-03-27 RX ORDER — ACETAMINOPHEN 325 MG/1
650 TABLET ORAL
Status: DISCONTINUED | OUTPATIENT
Start: 2024-03-27 | End: 2024-03-28 | Stop reason: HOSPADM

## 2024-03-27 RX ORDER — LIDOCAINE 40 MG/G
CREAM TOPICAL
Status: DISCONTINUED | OUTPATIENT
Start: 2024-03-27 | End: 2024-03-27 | Stop reason: HOSPADM

## 2024-03-27 RX ORDER — NALOXONE HYDROCHLORIDE 0.4 MG/ML
0.4 INJECTION, SOLUTION INTRAMUSCULAR; INTRAVENOUS; SUBCUTANEOUS
Status: DISCONTINUED | OUTPATIENT
Start: 2024-03-27 | End: 2024-03-27 | Stop reason: HOSPADM

## 2024-03-27 RX ORDER — OXYCODONE HYDROCHLORIDE 5 MG/1
10 TABLET ORAL
Status: DISCONTINUED | OUTPATIENT
Start: 2024-03-27 | End: 2024-03-28 | Stop reason: HOSPADM

## 2024-03-27 RX ORDER — NALOXONE HYDROCHLORIDE 0.4 MG/ML
0.1 INJECTION, SOLUTION INTRAMUSCULAR; INTRAVENOUS; SUBCUTANEOUS
Status: DISCONTINUED | OUTPATIENT
Start: 2024-03-27 | End: 2024-03-27 | Stop reason: HOSPADM

## 2024-03-27 RX ORDER — ACETAMINOPHEN 325 MG/1
650 TABLET ORAL EVERY 4 HOURS PRN
Qty: 50 TABLET | Refills: 0 | Status: SHIPPED | OUTPATIENT
Start: 2024-03-27

## 2024-03-27 RX ORDER — CLINDAMYCIN PHOSPHATE 900 MG/50ML
900 INJECTION, SOLUTION INTRAVENOUS SEE ADMIN INSTRUCTIONS
Status: DISCONTINUED | OUTPATIENT
Start: 2024-03-27 | End: 2024-03-27 | Stop reason: HOSPADM

## 2024-03-27 RX ORDER — HYDROMORPHONE HYDROCHLORIDE 1 MG/ML
0.4 INJECTION, SOLUTION INTRAMUSCULAR; INTRAVENOUS; SUBCUTANEOUS EVERY 5 MIN PRN
Status: DISCONTINUED | OUTPATIENT
Start: 2024-03-27 | End: 2024-03-27 | Stop reason: HOSPADM

## 2024-03-27 RX ORDER — ACETAMINOPHEN 325 MG/1
975 TABLET ORAL ONCE
Status: COMPLETED | OUTPATIENT
Start: 2024-03-27 | End: 2024-03-27

## 2024-03-27 RX ORDER — NALOXONE HYDROCHLORIDE 0.4 MG/ML
0.1 INJECTION, SOLUTION INTRAMUSCULAR; INTRAVENOUS; SUBCUTANEOUS
Status: DISCONTINUED | OUTPATIENT
Start: 2024-03-27 | End: 2024-03-28 | Stop reason: HOSPADM

## 2024-03-27 RX ORDER — LIDOCAINE HYDROCHLORIDE 20 MG/ML
INJECTION, SOLUTION INFILTRATION; PERINEURAL PRN
Status: DISCONTINUED | OUTPATIENT
Start: 2024-03-27 | End: 2024-03-27

## 2024-03-27 RX ORDER — CLINDAMYCIN PHOSPHATE 900 MG/50ML
900 INJECTION, SOLUTION INTRAVENOUS
Status: COMPLETED | OUTPATIENT
Start: 2024-03-27 | End: 2024-03-27

## 2024-03-27 RX ORDER — ONDANSETRON 4 MG/1
4 TABLET, ORALLY DISINTEGRATING ORAL EVERY 30 MIN PRN
Status: DISCONTINUED | OUTPATIENT
Start: 2024-03-27 | End: 2024-03-27 | Stop reason: HOSPADM

## 2024-03-27 RX ORDER — ONDANSETRON 2 MG/ML
4 INJECTION INTRAMUSCULAR; INTRAVENOUS EVERY 30 MIN PRN
Status: DISCONTINUED | OUTPATIENT
Start: 2024-03-27 | End: 2024-03-27 | Stop reason: HOSPADM

## 2024-03-27 RX ORDER — AMOXICILLIN 250 MG
1-2 CAPSULE ORAL 2 TIMES DAILY
Qty: 30 TABLET | Refills: 0 | Status: SHIPPED | OUTPATIENT
Start: 2024-03-27

## 2024-03-27 RX ORDER — PROPOFOL 10 MG/ML
INJECTION, EMULSION INTRAVENOUS PRN
Status: DISCONTINUED | OUTPATIENT
Start: 2024-03-27 | End: 2024-03-27

## 2024-03-27 RX ORDER — MELOXICAM 15 MG/1
TABLET ORAL
COMMUNITY
Start: 2024-01-14

## 2024-03-27 RX ORDER — FENTANYL CITRATE 50 UG/ML
INJECTION, SOLUTION INTRAMUSCULAR; INTRAVENOUS PRN
Status: DISCONTINUED | OUTPATIENT
Start: 2024-03-27 | End: 2024-03-27

## 2024-03-27 RX ORDER — ONDANSETRON 4 MG/1
4 TABLET, ORALLY DISINTEGRATING ORAL
Status: DISCONTINUED | OUTPATIENT
Start: 2024-03-27 | End: 2024-03-28 | Stop reason: HOSPADM

## 2024-03-27 RX ORDER — ONDANSETRON 4 MG/1
4 TABLET, ORALLY DISINTEGRATING ORAL EVERY 30 MIN PRN
Status: DISCONTINUED | OUTPATIENT
Start: 2024-03-27 | End: 2024-03-28 | Stop reason: HOSPADM

## 2024-03-27 RX ORDER — OXYCODONE HYDROCHLORIDE 5 MG/1
5 TABLET ORAL
Status: DISCONTINUED | OUTPATIENT
Start: 2024-03-27 | End: 2024-03-28 | Stop reason: HOSPADM

## 2024-03-27 RX ORDER — ONDANSETRON 4 MG/1
4 TABLET, ORALLY DISINTEGRATING ORAL EVERY 8 HOURS PRN
Qty: 4 TABLET | Refills: 0 | Status: SHIPPED | OUTPATIENT
Start: 2024-03-27

## 2024-03-27 RX ORDER — BUPIVACAINE HYDROCHLORIDE 5 MG/ML
INJECTION, SOLUTION EPIDURAL; INTRACAUDAL
Status: COMPLETED | OUTPATIENT
Start: 2024-03-27 | End: 2024-03-27

## 2024-03-27 RX ORDER — FENTANYL CITRATE 50 UG/ML
25-50 INJECTION, SOLUTION INTRAMUSCULAR; INTRAVENOUS
Status: DISCONTINUED | OUTPATIENT
Start: 2024-03-27 | End: 2024-03-27 | Stop reason: HOSPADM

## 2024-03-27 RX ORDER — ONDANSETRON 2 MG/ML
4 INJECTION INTRAMUSCULAR; INTRAVENOUS EVERY 30 MIN PRN
Status: DISCONTINUED | OUTPATIENT
Start: 2024-03-27 | End: 2024-03-28 | Stop reason: HOSPADM

## 2024-03-27 RX ADMIN — Medication 0.5 MG: at 11:59

## 2024-03-27 RX ADMIN — LIDOCAINE HYDROCHLORIDE 40 MG: 20 INJECTION, SOLUTION INFILTRATION; PERINEURAL at 09:01

## 2024-03-27 RX ADMIN — SODIUM CHLORIDE, SODIUM LACTATE, POTASSIUM CHLORIDE, CALCIUM CHLORIDE: 600; 310; 30; 20 INJECTION, SOLUTION INTRAVENOUS at 12:17

## 2024-03-27 RX ADMIN — FENTANYL CITRATE 50 MCG: 50 INJECTION, SOLUTION INTRAMUSCULAR; INTRAVENOUS at 08:40

## 2024-03-27 RX ADMIN — PROPOFOL 50 MG: 10 INJECTION, EMULSION INTRAVENOUS at 12:47

## 2024-03-27 RX ADMIN — FENTANYL CITRATE 50 MCG: 50 INJECTION, SOLUTION INTRAMUSCULAR; INTRAVENOUS at 10:30

## 2024-03-27 RX ADMIN — ACETAMINOPHEN 975 MG: 325 TABLET ORAL at 08:07

## 2024-03-27 RX ADMIN — PROPOFOL 350 MCG/KG/MIN: 10 INJECTION, EMULSION INTRAVENOUS at 09:01

## 2024-03-27 RX ADMIN — CLINDAMYCIN PHOSPHATE 900 MG: 900 INJECTION, SOLUTION INTRAVENOUS at 08:52

## 2024-03-27 RX ADMIN — FENTANYL CITRATE 50 MCG: 50 INJECTION, SOLUTION INTRAMUSCULAR; INTRAVENOUS at 10:42

## 2024-03-27 RX ADMIN — LIDOCAINE HYDROCHLORIDE 60 MG: 20 INJECTION, SOLUTION INFILTRATION; PERINEURAL at 09:04

## 2024-03-27 RX ADMIN — BUPIVACAINE HYDROCHLORIDE 20 ML: 5 INJECTION, SOLUTION EPIDURAL; INTRACAUDAL at 08:45

## 2024-03-27 RX ADMIN — SODIUM CHLORIDE, SODIUM LACTATE, POTASSIUM CHLORIDE, CALCIUM CHLORIDE: 600; 310; 30; 20 INJECTION, SOLUTION INTRAVENOUS at 08:39

## 2024-03-27 NOTE — OR NURSING
Patient received left side Supraclavicular nerve block  with Exparel.  Fentanyl 50mcg and Versed 1mg given. Tolerated procedure well.

## 2024-03-27 NOTE — ANESTHESIA CARE TRANSFER NOTE
Patient: James Kasper    Procedure: Procedure(s):  LEFT OPEN REDUCTION INTERNAL FIXATION, FOREARM       Diagnosis: Forearm fractures, both bones, closed, left, initial encounter [S52.92XA, S52.202A]  Diagnosis Additional Information: No value filed.    Anesthesia Type:   MAC     Note:    Oropharynx: spontaneously breathing and oral airway in place  Level of Consciousness: drowsy  Oxygen Supplementation: face mask  Level of Supplemental Oxygen (L/min / FiO2): 6  Independent Airway: airway patency satisfactory and stable  Dentition: dentition unchanged  Vital Signs Stable: post-procedure vital signs reviewed and stable  Report to RN Given: handoff report given  Patient transferred to: PACU  Comments: Resps easy and regular. Report to PACU RN  Handoff Report: Identifed the Patient, Identified the Reponsible Provider, Reviewed the pertinent medical history, Discussed the surgical course, Reviewed Intra-OP anesthesia mangement and issues during anesthesia, Set expectations for post-procedure period and Allowed opportunity for questions and acknowledgement of understanding    Vitals:  Vitals Value Taken Time   /71 03/27/24 1300   Temp     Pulse 58 03/27/24 1301   Resp 11 03/27/24 1301   SpO2 100 % 03/27/24 1301   Vitals shown include unfiled device data.    Electronically Signed By: NAKITA MOISE CRNA  March 27, 2024  1:02 PM

## 2024-03-27 NOTE — DISCHARGE INSTRUCTIONS
"[facility]  Home Care Following Anesthesia  For 24 hours after surgery:  Get plenty of rest.  A responsible adult must stay with you for at least 24 hours after you leave the surgery center.  Do not drive or use heavy equipment.  If you have weakness or tingling, don't drive or use heavy equipment until this feeling goes away.   Do not drink alcohol.   Avoid strenuous or risky activities.  Ask for help when climbing stairs.  You may feel lightheaded.  IF so, sit for a few minutes before standing.  Have someone help you get up.   If you have nausea (feel sick to your stomach): Drink only clear liquids such as apple juice, ginger ale, broth or 7-Up.  Rest may also help.  Be sure to drink enough fluids.  Move to a regular diet as you feel able.   You may have a slight fever.  Call the doctor if your fever is over 100 F (37.7 C) (taken under the tongue) or lasts longer than 24 hours.  You may have a dry mouth, a sore throat, muscle aches or trouble sleeping. These should go away after 24 hours.  Do not make important or legal decisions.   It is recommended to avoid smoking.        Today you received an Exparel block to numb the nerves near your surgery site.  This is a block using local anesthetic or \"numbing\" medication injected around the nerves to anesthetize or \"numb\" the area supplied by those nerves.  This block is injected into the muscle layer near your surgical site.  This medication may numb the location where you had surgery up to 72 hours.  If your surgical site is an arm or leg you should be careful with your affected limb, since it is possible to injure your limb without being aware of it due to the numbing.  Until full feeling returns, you should guard against bumping or hitting your limb, and avoid extreme hot or cold temperatures on the skin.  As the block wears off, the feeling will return as a tingling or prickly sensation near your surgical site.  You will " experince more discomfort from your incision as the feeling returns.  You may want to take a pain pill (a narcotic or Tylenol if this was prescribed by your surgeon) when you start to experience mild pain before the pain beomes more severe.  If your pain medications do not control your pain, you should notify your surgeon.    Tips for taking pain medications  To get the best pain relief possible, remember these points:  Take pain medications as directed, before pain becomes severe.  Pain medication can upset your stomach: taking it with food may help.  Constipation is a common side effect of pain medication. Drink plenty of  fluids.  Eat foods high in fiber. Take a stool softener if recommended by your doctor or pharmacist.  Do not drink alcohol, drive or operate machinery while taking pain medications.  Ask about other ways to control pain, such as with heat, ice or relaxation.    Tylenol/Acetaminophen Consumption    If you feel your pain relief is insufficient, you may take Tylenol/Acetaminophen in addition to your narcotic pain medication.   Be careful not to exceed 4,000 mg of Tylenol/Acetaminophen in a 24 hour period from all sources.  If you are taking extra strength Tylenol/acetaminophen (500 mg), the maximum dose is 8 tablets in 24 hours.  If you are taking regular strength acetaminophen (325 mg), the maximum dose is 12 tablets in 24 hours.    Call a doctor for any of the following:  Signs of infection (fever, growing tenderness at the surgery site, a large amount of drainage or bleeding, severe pain, foul-smelling drainage, redness, swelling).  It has been over 8 to 10 hours since surgery and you are still not able to urinate (pass water).  Headache for over 24 hours.  Numbness, tingling or weakness the day after surgery (if you had spinal anesthesia).  Signs of Covid-19 infection (temperature over 100 degrees, shortness of breath, cough, loss of taste/smell, generalized body aches, persistent headache,  chills, sore throat, nausea/vomiting/diarrhea)  Your doctor is:       Dr. Dionicio Woodard, Orthopaedics: [phone]               Or dial [phone] and ask for the resident on call for:  Orthopaedics  For emergency care, call the:  [facility]:  [phone] (TTY for hearing impaired: [phone])

## 2024-03-27 NOTE — OP NOTE
Date of Surgery: March 27, 2024    Location: [facility]    Surgeon: Dionicio Woodard MD     Co-Surgeon: Olvin Matta MD    This surgery required two surgeons given the complexity of the situation and because no qualified residents or assistants were available. Dr. Matta's assistance was required for patient positioning, draping, retraction, exposure, fracture reduction, fixation, and wound closure.     Pre-operative Diagnosis:   1) Closed Left Radius and Ulna Diaphyseal Fracture    Post-operative Diagnosis:   1) Closed Left Radius and Ulna Diaphyseal Fracture    Procedure:   1) Open reduction and internal fixation of closed left mid diaphyseal radius and ulna fracture - 97175     Implants:  Related Content Database (RCDb) VariAx Titanium 3.5mm Compression plates: 9 hole broad curved, 8 hole broad straight. 2.0mm screw x1, 2.7mm screw x1. 3.5mm screw x12.    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  He was evaluated on field by the athletic training staff and by myself in the training room.  For full history and physical please see that note.  In brief review, he went to the emergency room at [facility] for reduction and long-arm splinting.  As this was a closed injury that was grossly aligned and stable in his long-arm splint, we elected to discharge him from the ED for a scheduled left radius and ulna open reduction and internal fixation on outpatient basis.    Procedure Details:  On the day of surgery the patient was met in the preoperative holding area.  I discussed with the patient the risks and benefits of operative intervention  and nonoperative alternatives.  We discussed the nonoperative management in the form of continued long-arm splinting for 4 to 6 weeks followed by progressive range of motion.  I discussed with him the significantly high risk of malunion and a 100% displaced both bone forearm fracture in adults, as well as the unacceptable nonunion rate with significant risk of loss of range of motion in pronation supination.  We discussed operative management in the form of a left radius and ulna open reduction internal fixation.  I outlined the operative technique.  We discussed the risks and benefits of operative intervention including but not limited to bleeding, infection, damage surrounding nerves and blood vessels, malunion, nonunion, loss of function, loss of strength, loss of range of motion, refracture, loss of limb and loss of life.  I did opportunity answer his questions.  He wished to move forward with operative intervention.  The correct limb was confirmed and marked with a permanent skin marker.  A regional anesthetic block was placed by my anesthesia colleagues.    The patient was brought back to the operative suite.  He was transferred from the hospital bed to the operative table without incident and secured using a belt.  Monitored anesthetic care was instilled by my anesthesia colleagues. a nonsterile left upper arm tourniquet was placed.  The left arm was sterilely prepped and draped in the normal fashion.  A final timeout was performed with all in the room in agreement with the correct patient, site, procedure, and surgeon's, as well as preoperative antibiotics have been instilled.    The arm was exsanguinated and tourniquet was placed to 250 mmHg.  We first began with the radius.  We utilized a volar approach of Umberto to the radial shaft.  X-ray was brought in to identify the location of the fractures.  We utilized a 9 cm longitudinal incision over the volar aspect of the forearm, centered over the ulnar aspect  of the brachial radialis.  Meticulous dissection was carried on the fascial layer.  Meticulous hemostasis was achieved throughout.  The brachial radialis was identified and we dissected ulnar to this.  The fascia was gently incised.  This allowed direct visualization of the radial artery and veins.  We gently freed these from the brachial radialis by ligating any recurrent vessels to the brachial radialis.  This allowed us to retract the bundle ulnarly.  We are also then able to direct the event notify the sensory radial nerve.  This was retracted radially and protected throughout.  By developing this plane, we were able to identify the FDS muscle belly and the pronator teres.  The FDS had been traumatically split by the fracture.  We further incised this longitudinally over the volar shaft of the radius.  The FDS was cleared from the radial shaft proximally and distally.  We also took down a slight amount of the pronator teres from the proximal aspect.  The far remained supinated throughout.  This allowed adequate visualization of the volar aspect of the radius.    The 2 fracture ends were cleared of any soft tissue.  There was noted to be a butterfly fragment on the ulnar dorsal aspect of the fracture.  We also cleared this fragment free of any soft tissue.  This fragment was first reduced to the proximal shaft with point-to-point reduction clamps.  We confirmed anatomic alignment by direct visualization.  We placed 2 of the 2.0 millimeter screws through this fracture site from dorsal ulnar to volar radial to fix the butterfly fragment to the proximal shaft fracture.  This achieved good fixation.  We then reduced the proximal fragment to the distal fragment.  I utilized a small spike on the radial aspect to keep in the reduction.  Unfortunately during the reduction maneuver, the butterfly fragment did comminuted through the most distal screw hole.  That half of the fragment was removed and placed in normal saline  for later implantation.  The most distal 2.0 millimeter screw was removed.  The proximal screw still achieved good fixation.    We reduced the fracture.  This was noted to be anatomic by direct visualization.  We then selected a broad curve 3.5 mm compression plate from the [place] VariAx set.  We ultimately elected to go with a 9 hole plate as this provided 3 screw holes proximal and distal to the fracture site that could achieve bicortical fixation.  This was clamped into place proximally and distally.  We placed a 3.5 mm nonlocking screw through a static screw hole distal to the fracture fragment.  This was bicortical achieve good fixation.  We then rolled and placed a 3.5 mm nonlocking screw in compression mode in the second from last oblong screw hole proximal.  This was drilled eccentrically away from the fracture site.  This was drilled bicortically.  A 3.5 mm nonlocking cortical screw was placed and achieved good compression via direct visualization at the fracture site.    At this time implants removed and x-rays were brought in.  This can firmed anatomic reduction of the fracture site.  We therefore placed 2 additional bicortical 3.5 millimeter screws proximal and distal to the fracture for a total of 3 bicortical screws proximally and distally.  All achieve good fixation.  At this time x-rays brought in and 90 degree views confirmed appropriate screw length and fracture reduction.  The remaining small butterfly fragment was then sewn back into place through the plate using 0 Vicryl.    The elbow was then flexed and we turned our attention to the ulnar shaft.  We utilized a direct approach to the ulnar shaft.  A 7 cm longitudinal incision was made over the ulnar aspect of the forearm, centered over the fracture site.  Meticulous dissection and hemostasis was maintained to dissect down to the fascial layer.  The fascia was gently split.  The fracture site was able to be directly visualized. the FCU and ECU  were gently lifted from the volar and dorsal aspect of the ulna proximally and distally.  The fracture site was then thoroughly irrigated.  This is noted to be a short oblique.  The fracture was reduced and clamped into place with 2 point-to-point reduction clamps.  We confirmed anatomic reduction by direct visualization.  I placed a lag screw from the ulnar to proximal volar in a lag by technique fashion.  This was a 2.7 millimeter screw and achieved good bite.  The reduction clamps were removed and the leg screw held reduction.  At this time we let down the tourniquet as we are nearing the 2-hour time point.  There were some small oozing veins in the volar approach, but no pulsatile bleeding.  The small bridging veins were coagulated.  2+ radial pulse was noted    We then returned our attention to the ulna.  We selected a 7 hole straight broad plate.  This was placed on the volar surface of the ulna.  This was clamped into place.  We placed two of the 3.5 mm bio cortical screws distally.  We then placed a 3.5 mm bicortical screw in the proximal oblong screw hole in compression mode.  This achieved good compression across the fracture site.  X-rays brought in.  We noted that we would only be able to achieve 2 bicortical screws proximal to the fracture site with the sentinel plate.  We therefore removed it and elected to go with an 8 hole plate.  The plate was laced onto the volar aspect of the ulna.  It was fixed into place using the 2 prior distal screw holes.  These achieved good fixation.  I then selected a new oblong hole in the proximal aspect to drill eccentrically in compression mode.  A 3.5 millimeter screw was placed bicortically and mild compression mode.  This again achieved a slight amount of additional compression at the fracture site.  At this time x-ray was brought in, and 90 degree fluoroscopic views confirmed that the plate was appropriate length to achieve 3 bicortical screws proximal and distal  to the fracture site.  I then placed an additional bicortical screw distally and 2 additional bicortical screws proximally for total of 3 bicortical 3.5 mm cortex screws proximal and distal to the fracture site.    At this time the incisions were thoroughly irrigated.  X-rays brought in.  Final orthogonal views confirmed appropriate plate location, screw length, and fracture reduction.  AP and lateral x-ray of the elbow confirmed appropriate reduction of the radial head to the capitellum.  AP and lateral x-ray of the wrist confirmed no DRUJ injury or malreduction of the ulnar head.  There was no increase in shock of the distal radius and ulna.  The arm was brought through range of motion.  I was able to achieve full extension and 140 degrees of flexion.  Is able achieve 90 degrees of pronation and supination with the elbow at 90 degrees of flexion.  There is no mechanical block to range of motion or crepitus.  2+ radial pulse was noted.  Fingers were warm and well-perfused    I then thoroughly irrigated the incision sites.  I loosely approximated the volar fascia with 4 stitches of 2-0 Vicryl.  This left approximately 1 cm gap in the fascia, so as to not close the fascial compartment.  The radial vascular bundle was directly visualized throughout.  The skin was then closed in a layered fashion with 2-0 Vicryl and 3-0 nylon in a horizontal mattress fashion.  I then closed the ulnar incision.  I first loosely approximated the fascia over the ulnar plate to cover the plate.  I then closed the skin in a layered fashion with 2-0 Vicryl and 3-0 nylon in a horizontal mattress fashion.    The forearm was noted to be edematous, but compressible both volarly and ulnarly.  Fingers were warm and well-perfused with 2-second cap refill.  2+ radial pulse.  Soft dressings were applied.  The drapes taken down.  The patient was awoken from monitored anesthetic care.  He was transferred from the operative table to hospital bed without  incident.  He returned to PACU in stable condition.    All sponge, needle, and instrument counts were correct at the end the case.    Post-Operative Plan:  The patient will be discharged home when stable from PACU and meeting discharge criteria.  I prescribed oral pain medication and antinausea medication to the outpatient pharmacy.  He will be nonweightbearing in the left upper extremity.  He is cleared for active and passive range of motion of the elbow and wrist as symptoms allow.  He is to ice and elevate for the majority of the day the first 5 to 7 days after surgery to decrease swelling.  He will start physical therapy with his athletic training staff.  He will follow-up with me in training room in 10 to 14 days for suture removal.    Dioniico Woodard MD    [facility]   Department of Orthopedic Surgery      Disclaimer: This note consists of symbols derived from keyboarding, dictation and/or voice recognition software. As a result, there may be errors in the script that have gone undetected. Please consider this when interpreting information found in this chart.

## 2024-03-27 NOTE — ANESTHESIA POSTPROCEDURE EVALUATION
Patient: James Kasper    Procedure: Procedure(s):  LEFT OPEN REDUCTION INTERNAL FIXATION, FOREARM       Anesthesia Type:  MAC    Note:  Disposition: Outpatient   Postop Pain Control: Uneventful            Sign Out: Well controlled pain   PONV: No   Neuro/Psych: Uneventful            Sign Out: Acceptable/Baseline neuro status   Airway/Respiratory: Uneventful            Sign Out: Acceptable/Baseline resp. status   CV/Hemodynamics: Uneventful            Sign Out: Acceptable CV status; No obvious hypovolemia; No obvious fluid overload   Other NRE: NONE   DID A NON-ROUTINE EVENT OCCUR? No           Last vitals:  Vitals Value Taken Time   /71 03/27/24 1300   Temp     Pulse 64 03/27/24 1308   Resp 8 03/27/24 1308   SpO2 99 % 03/27/24 1308   Vitals shown include unfiled device data.    Electronically Signed By: Natanael Rivera DO  March 27, 2024  1:09 PM

## 2024-03-27 NOTE — ANESTHESIA PROCEDURE NOTES
Brachial plexus Procedure Note    Pre-Procedure   Staff -        Anesthesiologist:  Natanael Rivera DO       Resident/Fellow: Praful Lopez MD       Performed By: fellow       Location: pre-op       Procedure Start/Stop Times: 3/27/2024 8:45 AM and 3/27/2024 8:50 AM       Pre-Anesthestic Checklist: patient identified, IV checked, site marked, risks and benefits discussed, informed consent, monitors and equipment checked, pre-op evaluation, at physician/surgeon's request and post-op pain management  Timeout:       Correct Patient: Yes        Correct Procedure: Yes        Correct Site: Yes        Correct Position: Yes        Correct Laterality: Yes        Site Marked: Yes  Procedure Documentation  Procedure: Brachial plexus       Diagnosis: POST OPERATIVE PAIN CONTROL AND INTRAOP PAIN CONTROL       Laterality: left       Patient Position: sitting       Patient Prep/Sterile Barriers: sterile gloves, mask       Skin prep: Chloraprep (supraclavicular approach).       Needle Type: insulated       Needle Gauge: 21.        Needle Length (Inches): 4        Ultrasound guided       1. Ultrasound was used to identify targeted nerve, plexus, vascular marker, or fascial plane and place a needle adjacent to it in real-time.       2. Ultrasound was used to visualize the spread of anesthetic in close proximity to the above referenced structure.       3. A permanent image is entered into the patient's record.    Assessment/Narrative         The placement was negative for: blood aspirated, painful injection and site bleeding       Paresthesias: No.       Bolus given via needle..        Secured via.        Complications: none    Medication(s) Administered   Bupivacaine 0.5% PF (Infiltration) - Infiltration   20 mL - 3/27/2024 8:45:00 AM  Bupivacaine liposome (Exparel) 1.3% LA inj susp (Infiltration) - Infiltration   10 mL - 3/27/2024 8:45:00 AM  Medication Administration Time: 3/27/2024 8:45 AM     Comments:  133mg of exparel  "administered as above.    Of note, prior to nerve block, patient expressed he was having numbness/tingling in his pinky, and weakness in the fingers of his hand not in a cast/splint. We discussed that he may continue to have these sensations, or they may be heightened, after there nerve block. Pt acknowledged risks of nerve block including nerve damage, damage to surrounding structures/tissues, infection, amongst others. Patient agreed to proceed with nerve block.       FOR [facility] ([facility] ONLY:   Pain Team Contact information: please page the Pain Team Via datango. Search \"Pain\". During daytime hours, please page the attending first. At night please page the resident first.      "

## 2024-03-27 NOTE — BRIEF OP NOTE
[facility] Brief Operative Note    Pre-operative diagnosis: Forearm fractures, both bones, closed, left, initial encounter [S52.92XA, S52.202A]   Post-operative diagnosis ORIF Left Both Bone Forearm Fracture   Procedure: Procedure(s):  LEFT OPEN REDUCTION INTERNAL FIXATION, FOREARM   Surgeon(s): Surgeon(s) and Role:     * Dionicio Woodard MD - Primary     * Olvin Matta MD - Assisting   Estimated blood loss: * No values recorded between 3/27/2024  9:18 AM and 3/27/2024 12:46 PM *    Specimens: * No specimens in log *   Findings: None       Dionicio Woodard MD

## 2024-03-28 ENCOUNTER — NURSE TRIAGE (OUTPATIENT)
Dept: NURSING | Facility: CLINIC | Age: 24
End: 2024-03-28
Payer: COMMERCIAL

## 2024-03-28 NOTE — TELEPHONE ENCOUNTER
Nurse Triage SBAR    Is this a 2nd Level Triage? YES, LICENSED PRACTITIONER REVIEW IS REQUIRED    Situation: Post op Pain    Background: :Patient is POD 0 left open reduction internal fixation of forearm    Assessment: Patient's significant other, Maleisa, (verbal CTC given by patient) calling to report patient has 10/10 pain. He took a dose of oxycodone at 2230 and additional dose at 2300, and 650 mg acetaminophen at 2030. He denies nausea, headache, fever, or dehydration.    Protocol Recommended Disposition:   According to the protocol, patient should call on-call provider.      Paged to provider Brock Valle at 0018 direct to patient per protocol.    Qian Olmedo RN  03/28/24 12:21 AM  [facility] Nurse Advisor      Reason for Disposition   [1] SEVERE post-op pain (e.g., excruciating, pain scale 8-10) AND [2] not controlled with pain medications    Additional Information   Negative: Sounds like a life-threatening emergency to the triager   Negative: Chest pain   Negative: Difficulty breathing   Negative: Acting confused (e.g., disoriented, slurred speech) or excessively sleepy   Negative: Post-Op tonsil and adenoid surgery, symptoms or questions about   Negative: Surgical incision symptoms and questions   Negative: [1] Pain or burning with passing urine (urination) AND [2] male   Negative: [1] Pain or burning with passing urine (urination) AND [2] female   Negative: Constipation   Negative: New or worsening leg (calf, thigh) pain   Negative: New or worsening leg swelling   Negative: Dizziness is severe, or persists > 24 hours after surgery   Negative: Pain, redness, swelling, or pus at IV Site   Negative: Symptoms arising from use of a urinary catheter (e.g., Coude, Downing)   Negative: Cast problems or questions   Negative: Medication question   Negative: [1] Widespread rash AND [2] bright red, sunburn-like   Negative: [1] Vomiting AND [2] persists > 4 hours   Negative: [1] Vomiting AND [2]  abdomen looks much more swollen than usual   Negative: [1] SEVERE headache AND [2] after spinal (epidural) anesthesia   Negative: [1] Drinking very little AND [2] dehydration suspected (e.g., no urine > 12 hours, very dry mouth, very lightheaded)   Negative: Patient sounds very sick or weak to the triager   Negative: Sounds like a serious complication to the triager   Negative: Fever > 100.4 F (38.0 C)    Protocols used: Post-Op Symptoms and [id number]

## 2024-03-30 NOTE — OP NOTE
Patient: James Kasper  : 2000  MRN: [id number]    DATE OF OPERATION: 2024      OPERATIVE REPORT       PREOPERATIVE DIAGNOSIS:  Closed left diaphyseal radius and ulna fractures     POSTOPERATIVE DIAGNOSIS:  Closed left diaphyseal radius and ulna fractures     PROCEDURE:  Open reduction internal fixation of radius and ulnar shaft fractures    SURGEON:  Olvin Matta MD    CO-SURGEON:  Dionicio Woodard MD    This surgery required two surgeons given the complexity of the situation and because no qualified residents or assistants were available. Dr. Woodard's assistance was required for patient positioning, draping, retraction, exposure, fracture reduction, fixation, and wound closure.    ANESTHESIA:  Regional + MAC     IMPLANTS:   Fortino VariAx Titanium 3.5mm compression plates  Interfragmentary lag screws 2.0 and 2.7mm    ESTIMATED BLOOD LOSS:  25cc    DVT PROPHYLAXIS:  SCDs    TOURNIQUET TIME:  114 minutes    SPECIMENS REMOVED:  None    INTRAOPERATIVE FINDINGS:  Displaced mid diaphyseal radius and ulna fractures.  Comminution of the radial shaft fracture with butterfly fragment    COMPLICATIONS:  None    DISPOSITION:  Stable to PACU.     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  Surgery was recommended to the patient.  I was asked to assist with the procedure.    The indications for surgery were discussed with the patient. The benefits, risks, and alternatives of operative management were discussed in detail with the patient by Dr. Woodard. Consent was obtained for the procedure.     DESCRIPTION OF PROCEDURE IN DETAIL:  The patient was seen in the preoperative care unit. Patient identity, consent, procedure to be performed, and operative site were verified with the patient. The left upper extremity was marked.  The regional anesthesia team performed a preoperative block.     The  patient was brought to the operating room and placed supine on the operating room table. The left upper extremity was placed on an armboard. SCDs were placed on the bilateral lower extremities. A well-padded tourniquet was placed on the upper arm.      Sedation was administered by anesthesia.     The left upper extremity was prepped and draped in the usual sterile fashion. A timeout was performed confirming the correct patient, procedure, operative site, and antibiotics. All were in agreement.    The limb was exsanguinated with Esmarch and the tourniquet inflated to 250 mmHg.  I began by performing a volar Umberto approach to the radius.  A longitudinal incision was made over the volar radial aspect of the forearm.  Blunt dissection was carried down to the forearm fascia which was longitudinally incised.  The lateral antebrachial cutaneous nerve was not identified within the field.  The interval between the brachioradialis and pronator teres was bluntly developed.  The radial artery and sensory nerve were identified and carefully dissected and mobilized.  The radial sensory nerve was brought radially and the artery mobilized ulnarly.  Proximal edge of the flexor digitorum superficialis muscle belly had been traumatically torn by the fracture.  This was bluntly elevated off of the shaft distally.  The proximal edge of the pronator teres was also mobilized, fully exposing the fracture.  Callus and soft tissue was cleaned from within the fracture fragment.  The soft tissue attachments were elevated off of the butterfly fragment. Dr. Woodard and I both performed the reduction and fixation of the fracture.  The butterfly fragment was reduced to the proximal shaft with a pointed reduction clamp.  Two 2.0 mm interfragmentary lag screws were then placed from the butterfly fragment into the proximal shaft with excellent fixation.  We then reduced the proximal and distal shaft fragments. Unfortunately the butterfly fragment  comminuted and fractured through at the distal lag screw. This half of the fragment and the distal lag screw were removed. We were still able to achieve a good cortical read along the volar surface. The fracture ends were reduced and held provisionally with clamps. A 3.5mm compression plate was then placed on the volar surface. A static distal cortical screw was placed. A second screw was then placed eccentrically in the oblong screw hole proximally. This provided excellent compression across the fracture site. The remaining cortical screws were drilled, measured, and placed to provide 3 bicortical screws proximal and distal to the fracture. Fluoroscopic evaluation demonstrated anatomic fracture reduction with appropriate plate and screw lengths. Dr. Woodard repaired the small butterfly fracture fragment with an 0-Vicryl suture.     Attention was turned to the ulna. I made a longitudinal incision was made along the subcutaneous border of the ulna. Blunt dissection was carried down through subcutaneous tissues to the bone. The interval between the ECU and FCU was incised and the muscle mobilized off the dorsal and volar surfaces. The dorsal ulnar sensory nerve was not encountered within the field. Once the short oblique fracture was fully exposed, it was reduced and held provisionally with pointed reduction clamps. Dr. Woodard then placed an interfragmentary lag screw across the fracture. Provisional clamps were removed. Tourniquet was deflated and hemostasis assured. A 3.5mm compression plate was placed along the volar surface of the ulna. Again, two cortical screws were placed distally and proximally deploying the plate in compression mode. Unfortunately fluoroscopic evaluation revealed that the plate was just short and we could not place two bicortical screws proximal to the fracture. Therefore decision was made to the swap the plate out for a longer plate. At this point, I had to scrub out of the case. Remainder of  case to be dictated by Dr. Woodard.    Olvin Matta MD     Hand, Upper Extremity & Microvascular Surgery  Department of Orthopedic Surgery  [facility]

## 2024-04-02 ENCOUNTER — DOCUMENTATION ONLY (OUTPATIENT)
Dept: FAMILY MEDICINE | Facility: CLINIC | Age: 24
End: 2024-04-02

## 2024-04-02 NOTE — PROGRESS NOTES
[facility] ATHLETICS    Physical Therapy follow-up note  Treating therapist: Keegan Castro DPT    Date of service performed: April 2, 2024    Concern/injury: s/p L radial and ulnar ORIF DOS: 3/27/24    Subjective: Pt now 6 days post op. Reports limited full flexion of all 5 digits.    Objective: Supination to neutral, pronation 40 deg, 4-way wrist ROM limited 50%, good tolerance for shoulder AROM    Assessment/plan:   Gameready heat applied pre-exercise, PROM of elbow, wrist, and shoulder well tolerated, gameready cold and compression applied post-exercise x 15 min  Exercises performed:   Thumb DIP flexion 2x10  Digits 2-5 flex/ext 2x10  Supination/prontation to tolerance 2x10  Elbow flex/ext 2x10 (end range limited both directions)  4-way wrist holding foam ball 2x10 ea  Ball squeeze and key  squeeze 2x8  Shoulder flex/abd 2x10 ea      Keegan Castro

## 2024-04-06 ENCOUNTER — OFFICE VISIT (OUTPATIENT)
Dept: ORTHOPEDICS | Facility: CLINIC | Age: 24
End: 2024-04-06

## 2024-04-06 DIAGNOSIS — Z87.81 S/P ORIF (OPEN REDUCTION INTERNAL FIXATION) FRACTURE: Primary | ICD-10-CM

## 2024-04-06 DIAGNOSIS — Z98.890 S/P ORIF (OPEN REDUCTION INTERNAL FIXATION) FRACTURE: Primary | ICD-10-CM

## 2024-04-06 NOTE — LETTER
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  Overall he has been doing well.  He is weaned off of his pain medication.  He is taking Tylenol as needed for pain.  He is working on elbow and wrist range of motion with the athletic training staff.  He is achieved near full range of motion of the wrist and elbow.  He is still working on pronation supination.  He notes some slight decrease in sensation occasional tingling on the ulnar aspect of his small finger.  He denies any significant weakness in the fingers.    Objective:   PE:  LUE: Surgical incisions over the volar and ulnar aspect of the forearm healing appropriately without erythema or drainage.  Global edema in the left hand improving from surgery.  Compartments soft and compressible.  0 to 120 degrees of active and passive elbow flexion without pain.  60 degrees of wrist flexion and extension without pain.  30 degrees of passive supination to 60 degrees of passive pronation limited by feelings of stiffness in the wrist.  Soft endpoint.  5/5 wrist flexion extension.  5/5 flexion and extension at the MCP, PIP, DIP in all 5 digits.  Sensation tact in radial, median, and ulnar nerve distribution in the hand.  2+ radial pulse.    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  Overall he is doing very well.  His elbow and wrist range of motion is very good for 10 days after surgery.  Incisions healing appropriately.  Nylon sutures were removed today.  He is still working on pronation and supination.  This is expected for only being 10 days out from surgery.  He is working on active and passive range of motion with the athletic training  staff.  He has a 1 pound lifting restriction.  Will obtain formal three-view x-rays of the left forearm this week.  He will follow-up with me in training room in 4 weeks.    Dionicio Woodard MD    [facility]   Department of Orthopedic Surgery      Disclaimer: This note consists of symbols derived from keyboarding, dictation and/or voice recognition software. As a result, there may be errors in the script that have gone undetected. Please consider this when interpreting information found in this chart.        Sincerely,        Dionicio Woodard MD

## 2024-04-06 NOTE — PROGRESS NOTES
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  Overall he has been doing well.  He is weaned off of his pain medication.  He is taking Tylenol as needed for pain.  He is working on elbow and wrist range of motion with the athletic training staff.  He is achieved near full range of motion of the wrist and elbow.  He is still working on pronation supination.  He notes some slight decrease in sensation occasional tingling on the ulnar aspect of his small finger.  He denies any significant weakness in the fingers.    Objective:   PE:  LUE: Surgical incisions over the volar and ulnar aspect of the forearm healing appropriately without erythema or drainage.  Global edema in the left hand improving from surgery.  Compartments soft and compressible.  0 to 120 degrees of active and passive elbow flexion without pain.  60 degrees of wrist flexion and extension without pain.  30 degrees of passive supination to 60 degrees of passive pronation limited by feelings of stiffness in the wrist.  Soft endpoint.  5/5 wrist flexion extension.  5/5 flexion and extension at the MCP, PIP, DIP in all 5 digits.  Sensation tact in radial, median, and ulnar nerve distribution in the hand.  2+ radial pulse.    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  Overall he is doing very well.  His elbow and wrist range of motion is very good for 10 days after surgery.  Incisions healing appropriately.  Nylon sutures were removed today.  He is still working on pronation and supination.  This is expected for only being 10 days out from surgery.  He is working on active and passive range of motion with the athletic training staff.  He has a 1 pound lifting restriction.  Will obtain formal three-view x-rays of the  left forearm this week.  He will follow-up with me in training room in 4 weeks.    Dionicio Woodard MD    [facility]   Department of Orthopedic Surgery      Disclaimer: This note consists of symbols derived from keyboarding, dictation and/or voice recognition software. As a result, there may be errors in the script that have gone undetected. Please consider this when interpreting information found in this chart.

## 2024-04-06 NOTE — LETTER
4/6/2024      RE: James Kasper  [address] [place] [address]     Dear Colleague,    Thank you for referring your patient, James Kasper, to the [facility], operated by [facility]. Please see a copy of my visit note below.    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  Overall he has been doing well.  He is weaned off of his pain medication.  He is taking Tylenol as needed for pain.  He is working on elbow and wrist range of motion with the athletic training staff.  He is achieved near full range of motion of the wrist and elbow.  He is still working on pronation supination.  He notes some slight decrease in sensation occasional tingling on the ulnar aspect of his small finger.  He denies any significant weakness in the fingers.    Objective:   PE:  LUE: Surgical incisions over the volar and ulnar aspect of the forearm healing appropriately without erythema or drainage.  Global edema in the left hand improving from surgery.  Compartments soft and compressible.  0 to 120 degrees of active and passive elbow flexion without pain.  60 degrees of wrist flexion and extension without pain.  30 degrees of passive supination to 60 degrees of passive pronation limited by feelings of stiffness in the wrist.  Soft endpoint.  5/5 wrist flexion extension.  5/5 flexion and extension at the MCP, PIP, DIP in all 5 digits.  Sensation tact in radial, median, and ulnar nerve distribution in the hand.  2+ radial pulse.    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  Overall he is doing very well.  His elbow and wrist range of motion is very good for 10 days after surgery.  Incisions healing appropriately.  Nylon sutures were removed today.  He is still working on pronation and  supination.  This is expected for only being 10 days out from surgery.  He is working on active and passive range of motion with the athletic training staff.  He has a 1 pound lifting restriction.  Will obtain formal three-view x-rays of the left forearm this week.  He will follow-up with me in training room in 4 weeks.    Dionicio Woodard MD    [facility]   Department of Orthopedic Surgery      Disclaimer: This note consists of symbols derived from keyboarding, dictation and/or voice recognition software. As a result, there may be errors in the script that have gone undetected. Please consider this when interpreting information found in this chart.        Again, thank you for allowing me to participate in the care of your patient.      Sincerely,    Dionicio Woodard MD

## 2024-04-09 DIAGNOSIS — S52.92XA LEFT FOREARM FRACTURE: Primary | ICD-10-CM

## 2024-04-10 ENCOUNTER — ANCILLARY PROCEDURE (OUTPATIENT)
Dept: GENERAL RADIOLOGY | Facility: CLINIC | Age: 24
End: 2024-04-10
Attending: STUDENT IN AN ORGANIZED HEALTH CARE EDUCATION/TRAINING PROGRAM
Payer: COMMERCIAL

## 2024-04-10 DIAGNOSIS — S52.92XA LEFT FOREARM FRACTURE: ICD-10-CM

## 2024-04-10 PROCEDURE — 73090 X-RAY EXAM OF FOREARM: CPT | Mod: LT | Performed by: RADIOLOGY

## 2024-04-29 ENCOUNTER — ANCILLARY PROCEDURE (OUTPATIENT)
Dept: GENERAL RADIOLOGY | Facility: CLINIC | Age: 24
End: 2024-04-29
Attending: ORTHOPAEDIC SURGERY
Payer: COMMERCIAL

## 2024-04-29 DIAGNOSIS — M79.632 LEFT FOREARM PAIN: ICD-10-CM

## 2024-04-29 DIAGNOSIS — M79.632 LEFT FOREARM PAIN: Primary | ICD-10-CM

## 2024-04-29 PROCEDURE — 73090 X-RAY EXAM OF FOREARM: CPT | Mod: LT | Performed by: RADIOLOGY

## 2024-04-30 ENCOUNTER — OFFICE VISIT (OUTPATIENT)
Dept: ORTHOPEDICS | Facility: CLINIC | Age: 24
End: 2024-04-30
Payer: COMMERCIAL

## 2024-04-30 VITALS
BODY MASS INDEX: 25.19 KG/M2 | SYSTOLIC BLOOD PRESSURE: 133 MMHG | HEIGHT: 72 IN | DIASTOLIC BLOOD PRESSURE: 67 MMHG | WEIGHT: 186 LBS

## 2024-04-30 DIAGNOSIS — S52.92XS FOREARM FRACTURES, BOTH BONES, CLOSED, LEFT, SEQUELA: Primary | ICD-10-CM

## 2024-04-30 DIAGNOSIS — S52.202S FOREARM FRACTURES, BOTH BONES, CLOSED, LEFT, SEQUELA: Primary | ICD-10-CM

## 2024-04-30 NOTE — PROGRESS NOTES
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  He transferred to the [employer] as semester to play collegiate football.  He sustained a closed left both bone forearm fracture in practice.  He is now 6 weeks status post open reduction internal fixation of his left radius and ulna shaft.  He has been doing rehab with the athletic training staff for range of motion of his wrist and elbow.  He has been doing very well.  He has regained full active and passive range of motion of the elbow.  He has regained full supination and 90 degrees.  He has pronation to approximate 60 degrees.  He gets occasional ulnar-sided and dorsal wrist pain with activity.  He noted some decrease sensation over the ulnar border of his hand after surgery.  This has improved.  He still feels subjective weakness of IP flexion of his thumb.    This is the only orthopedic injury he has sustained due to collegiate football.  He does not have any other concerns today.    Objective:   PE:  LUE: Well-healed surgical incisions over the volar and dorsal aspect of the forearm.  Free and painless range of motion of the elbow.  Full extension to 140 degrees of flexion.  Active range of motion is equivalent to passive range of motion.  5/5 elbow flexion and extension.  60 degrees of wrist extension to 45 degrees of wrist flexion.  5/5 wrist flexion and extension.  I am able to passively supinate him to approximately 60 degrees with soft endpoint.  He notes some discomfort in the posterior ulnar aspect of the wrist with this.  Pronation to 90 degrees.  Active range of motion is equivalent to passive range of motion.  5/5 flexion and extension at the MCP, PIP, DIP of the index middle ring and small finger.  4/5 flexion of the IP joint of the thumb.  5/5 flexion at the MCP joint.  5/5 extension at the MCP and IP joint thumb.   Sensation intact in the radial, median, and ulnar nerve distribution in the left hand.  2+ radial pulse.  Fingers warm and well-perfused.    Imagin view x-ray of the left forearm from yesterday was reviewed.  This shows intact hardware and anatomic alignment of the radius and ulna.  Fracture line lucency still visible, but becoming more faded.  No hardware breakage or loosening.  Radial head articulates with the capitellum on all views.  No subluxation of the ulnar head or distal radial ulnar joint noted.    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  From the standpoint of his forearm, he is cleared for unrestricted active and passive range of motion of the elbow and wrist.  He is continue to work forearm supination.  At this time point I recommend starting light strengthening exercises and blood flow restriction with physical therapy.  I recommended to him that he start with light weights in the range of 1 to 5 pounds.  He should be able to do all lifts 15 times before advancing weight.  I recommend repeat his x-ray in 4 to 6 weeks.  If he is doing well at that time and x-rays are favorable, he may begin progressing back to his weight lifting in the gym and return to collegiate football.    Patient does not bring up any further orthopedic injuries or concerns at this time.  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  However, if he plays varsity athletics for another institution, or signs a contact to play professional sports, the [employer] may no longer cover his medical bills.  I the opportunity answer his questions.  He understands this.  Please feel free to reach out to me or the athletic training  staff with any questions or concerns about James's medical records.  I would be more than happy to see Tanya manuel as a patient at any time in the future.      Dionicio Woodard MD    [facility]   Department of Orthopedic Surgery      Disclaimer: This note consists of symbols derived from keyboarding, dictation and/or voice recognition software. As a result, there may be errors in the script that have gone undetected. Please consider this when interpreting information found in this chart.

## 2024-04-30 NOTE — LETTER
4/30/2024      RE: James Kasper  [address] [place] [address]     Dear Colleague,    Thank you for referring your patient, James Kasper, to the [facility]. Please see a copy of my visit note below.    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  He transferred to the [employer] as semester to play collegiate football.  He sustained a closed left both bone forearm fracture in practice.  He is now 6 weeks status post open reduction internal fixation of his left radius and ulna shaft.  He has been doing rehab with the athletic training staff for range of motion of his wrist and elbow.  He has been doing very well.  He has regained full active and passive range of motion of the elbow.  He has regained full supination and 90 degrees.  He has pronation to approximate 60 degrees.  He gets occasional ulnar-sided and dorsal wrist pain with activity.  He noted some decrease sensation over the ulnar border of his hand after surgery.  This has improved.  He still feels subjective weakness of IP flexion of his thumb.    This is the only orthopedic injury he has sustained due to collegiate football.  He does not have any other concerns today.    Objective:   PE:  LUE: Well-healed surgical incisions over the volar and dorsal aspect of the forearm.  Free and painless range of motion of the elbow.  Full extension to 140 degrees of flexion.  Active range of motion is equivalent to passive range of motion.  5/5 elbow flexion and extension.  60 degrees of wrist extension to 45 degrees of wrist flexion.  5/5 wrist flexion and extension.  I am able to passively supinate him to approximately 60 degrees with soft endpoint.  He notes some discomfort in the posterior ulnar aspect of the wrist with this.  Pronation to 90 degrees.  Active range of motion is equivalent to  passive range of motion.  5/5 flexion and extension at the MCP, PIP, DIP of the index middle ring and small finger.  4/5 flexion of the IP joint of the thumb.  5/5 flexion at the MCP joint.  5/5 extension at the MCP and IP joint thumb.  Sensation intact in the radial, median, and ulnar nerve distribution in the left hand.  2+ radial pulse.  Fingers warm and well-perfused.    Imagin view x-ray of the left forearm from yesterday was reviewed.  This shows intact hardware and anatomic alignment of the radius and ulna.  Fracture line lucency still visible, but becoming more faded.  No hardware breakage or loosening.  Radial head articulates with the capitellum on all views.  No subluxation of the ulnar head or distal radial ulnar joint noted.    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  From the standpoint of his forearm, he is cleared for unrestricted active and passive range of motion of the elbow and wrist.  He is continue to work forearm supination.  At this time point I recommend starting light strengthening exercises and blood flow restriction with physical therapy.  I recommended to him that he start with light weights in the range of 1 to 5 pounds.  He should be able to do all lifts 15 times before advancing weight.  I recommend repeat his x-ray in 4 to 6 weeks.  If he is doing well at that time and x-rays are favorable, he may begin progressing back to his weight lifting in the gym and return to collegiate football.    Patient does not bring up any further orthopedic injuries or concerns at this time.  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  However, if he plays varsity athletics for another  institution, or signs a contact to play professional sports, the University may no longer cover his medical bills.  I the opportunity answer his questions.  He understands this.  Please feel free to reach out to me or the athletic training staff with any questions or concerns about James's medical records.  I would be more than happy to see Tanya manuel as a patient at any time in the future.      Dionicio Woodard MD    [facility]   Department of Orthopedic Surgery      Disclaimer: This note consists of symbols derived from keyboarding, dictation and/or voice recognition software. As a result, there may be errors in the script that have gone undetected. Please consider this when interpreting information found in this chart.        Again, thank you for allowing me to participate in the care of your patient.      Sincerely,    Dionicio Woodard MD

## 2024-05-19 ENCOUNTER — HEALTH MAINTENANCE LETTER (OUTPATIENT)
Age: 24
End: 2024-05-19

## 2025-06-08 ENCOUNTER — HEALTH MAINTENANCE LETTER (OUTPATIENT)
Age: 25
End: 2025-06-08

## (undated) DEVICE — GLOVE BIOGEL PI MICRO INDICATOR UNDERGLOVE SZ 8.0 48980

## (undated) DEVICE — SU VICRYL 2-0 CT-1 27" UND J259H

## (undated) DEVICE — LINEN ORTHO PACK 5446

## (undated) DEVICE — Device

## (undated) DEVICE — PACK HAND CUSTOM ASC

## (undated) DEVICE — DRILL BIT 2.6X135MM SCALED 703

## (undated) DEVICE — SU ETHILON 3-0 PS-2 18" 1669H

## (undated) DEVICE — DRAPE C-ARM W/STRAPS 42X72" 07-CA104

## (undated) DEVICE — GLOVE BIOGEL PI MICRO SZ 8.0 48580

## (undated) DEVICE — ESU GROUND PAD ADULT W/CORD E7507

## (undated) DEVICE — SUCTION MANIFOLD NEPTUNE 2 SYS 1 PORT 702-025-000

## (undated) DEVICE — SOL NACL 0.9% IRRIG 500ML BOTTLE 2F7123

## (undated) DEVICE — OVERDRILL AO 2.7X122MM

## (undated) DEVICE — ESU PENCIL W/HOLSTER

## (undated) DEVICE — PREP CHLORAPREP 26ML TINTED ORANGE  260815

## (undated) DEVICE — COVER CAMERA IN-LIGHT DISP LT-C02

## (undated) DEVICE — GLOVE BIOGEL PI MICRO SZ 7.5 48575

## (undated) DEVICE — DRILL BIT AO 2.0X135MM SCALED

## (undated) DEVICE — ESU ELEC BLADE 2.75" COATED/INSULATED E1455

## (undated) RX ORDER — FENTANYL CITRATE 50 UG/ML
INJECTION, SOLUTION INTRAMUSCULAR; INTRAVENOUS
Status: DISPENSED
Start: 2024-03-27

## (undated) RX ORDER — PROPOFOL 10 MG/ML
INJECTION, EMULSION INTRAVENOUS
Status: DISPENSED
Start: 2024-03-27

## (undated) RX ORDER — CLINDAMYCIN PHOSPHATE 900 MG/50ML
INJECTION, SOLUTION INTRAVENOUS
Status: DISPENSED
Start: 2024-03-27

## (undated) RX ORDER — ACETAMINOPHEN 325 MG/1
TABLET ORAL
Status: DISPENSED
Start: 2024-03-27